# Patient Record
Sex: MALE | Race: WHITE | NOT HISPANIC OR LATINO | Employment: FULL TIME | URBAN - METROPOLITAN AREA
[De-identification: names, ages, dates, MRNs, and addresses within clinical notes are randomized per-mention and may not be internally consistent; named-entity substitution may affect disease eponyms.]

---

## 2023-03-07 ENCOUNTER — CLINICAL SUPPORT (OUTPATIENT)
Dept: PHARMACY | Facility: HOSPITAL | Age: 58
End: 2023-03-07
Payer: COMMERCIAL

## 2023-03-07 DIAGNOSIS — E11.69 DIABETES MELLITUS ASSOCIATED WITH HORMONAL ETIOLOGY (MULTI): Primary | ICD-10-CM

## 2023-03-07 RX ORDER — ATORVASTATIN CALCIUM 40 MG/1
40 TABLET, FILM COATED ORAL DAILY
COMMUNITY
End: 2023-05-30 | Stop reason: SDUPTHER

## 2023-03-07 RX ORDER — ASPIRIN 81 MG/1
81 TABLET ORAL DAILY
COMMUNITY
End: 2023-09-26 | Stop reason: ALTCHOICE

## 2023-03-07 RX ORDER — DAPAGLIFLOZIN 10 MG/1
TABLET, FILM COATED ORAL
COMMUNITY
End: 2023-05-30 | Stop reason: SDUPTHER

## 2023-03-07 RX ORDER — METFORMIN HYDROCHLORIDE 500 MG/1
1000 TABLET, FILM COATED, EXTENDED RELEASE ORAL EVERY MORNING
COMMUNITY
End: 2023-08-01 | Stop reason: SDUPTHER

## 2023-03-07 RX ORDER — SEMAGLUTIDE 1.34 MG/ML
1 INJECTION, SOLUTION SUBCUTANEOUS
COMMUNITY
End: 2024-02-07 | Stop reason: WASHOUT

## 2023-03-16 PROBLEM — M79.644 PAIN OF FINGER OF RIGHT HAND: Status: ACTIVE | Noted: 2023-03-16

## 2023-03-16 PROBLEM — M25.511 CHRONIC RIGHT SHOULDER PAIN: Status: ACTIVE | Noted: 2023-03-16

## 2023-03-16 PROBLEM — H52.4 MYOPIA WITH PRESBYOPIA OF BOTH EYES: Status: ACTIVE | Noted: 2023-03-16

## 2023-03-16 PROBLEM — I10 HYPERTENSION: Status: ACTIVE | Noted: 2023-03-16

## 2023-03-16 PROBLEM — H02.59: Status: ACTIVE | Noted: 2023-03-16

## 2023-03-16 PROBLEM — H52.03 HYPERMETROPIA OF BOTH EYES: Status: ACTIVE | Noted: 2023-03-16

## 2023-03-16 PROBLEM — G89.29 CHRONIC RIGHT SHOULDER PAIN: Status: ACTIVE | Noted: 2023-03-16

## 2023-03-16 PROBLEM — E78.5 TYPE 2 DIABETES MELLITUS WITH HYPERLIPIDEMIA (MULTI): Status: ACTIVE | Noted: 2023-03-16

## 2023-03-16 PROBLEM — H52.13 MYOPIA WITH PRESBYOPIA OF BOTH EYES: Status: ACTIVE | Noted: 2023-03-16

## 2023-03-16 PROBLEM — R93.89 ABNORMAL CHEST CT: Status: ACTIVE | Noted: 2023-03-16

## 2023-03-16 PROBLEM — E11.69 TYPE 2 DIABETES MELLITUS WITH HYPERLIPIDEMIA (MULTI): Status: ACTIVE | Noted: 2023-03-16

## 2023-03-16 PROBLEM — E55.9 VITAMIN D DEFICIENCY: Status: ACTIVE | Noted: 2023-03-16

## 2023-03-16 PROBLEM — M13.0 ARTHRITIS OF MULTIPLE SITES: Status: ACTIVE | Noted: 2023-03-16

## 2023-03-16 PROBLEM — D12.6 TUBULAR ADENOMA OF COLON: Status: ACTIVE | Noted: 2023-03-16

## 2023-03-16 PROBLEM — M25.512 LEFT SHOULDER PAIN: Status: ACTIVE | Noted: 2023-03-16

## 2023-03-16 PROBLEM — H02.203: Status: ACTIVE | Noted: 2023-03-16

## 2023-03-16 PROBLEM — D23.9 DERMATOFIBROMA: Status: ACTIVE | Noted: 2023-03-16

## 2023-03-16 PROBLEM — J30.9 ALLERGIC RHINITIS: Status: ACTIVE | Noted: 2023-03-16

## 2023-03-16 RX ORDER — LISINOPRIL 10 MG/1
1 TABLET ORAL DAILY
COMMUNITY
Start: 2013-07-18 | End: 2023-03-29 | Stop reason: SDUPTHER

## 2023-03-16 RX ORDER — MELOXICAM 15 MG/1
1 TABLET ORAL DAILY
COMMUNITY
Start: 2022-04-14 | End: 2023-05-30 | Stop reason: SDUPTHER

## 2023-03-16 RX ORDER — NAPROXEN 500 MG/1
500 TABLET ORAL EVERY 12 HOURS PRN
COMMUNITY
End: 2023-09-26 | Stop reason: ALTCHOICE

## 2023-03-16 RX ORDER — FENOFIBRATE 160 MG/1
1 TABLET ORAL DAILY
COMMUNITY
Start: 2013-09-18 | End: 2023-05-30 | Stop reason: SDUPTHER

## 2023-03-20 ENCOUNTER — TELEMEDICINE (OUTPATIENT)
Dept: PHARMACY | Facility: HOSPITAL | Age: 58
End: 2023-03-20
Payer: COMMERCIAL

## 2023-03-20 DIAGNOSIS — E11.69 DIABETES MELLITUS ASSOCIATED WITH HORMONAL ETIOLOGY (MULTI): ICD-10-CM

## 2023-03-20 ASSESSMENT — ENCOUNTER SYMPTOMS: DIABETIC ASSOCIATED SYMPTOMS: 0

## 2023-03-20 NOTE — PROGRESS NOTES
Jose E Zelaya is a 58 y.o. male was referred to Clinical Pharmacy Team to complete a comprehensive medication review (CMR) with a pharmacist as part of the [employer] diabetes program.      Referring Provider: Wilton Gibbons DO    Subjective   No Known Allergies    [facility] - [facility], [place] - [address]  [address]  [facility] [address]  Phone: [phone] Fax: [phone]      Diabetes  He presents for his follow-up diabetic visit. He has type 2 diabetes mellitus. His disease course has been improving. There are no hypoglycemic associated symptoms. There are no diabetic associated symptoms. Current diabetic treatment includes oral agent (dual therapy) (Farxiga 10mg, Metformin  mg 2 tabs, Ozempic 1 mg once weekly). He is compliant with treatment all of the time. An ACE inhibitor/angiotensin II receptor blocker is being taken.       Objective     There were no vitals taken for this visit.     LAB  Lab Results   Component Value Date    BILITOT 0.6 12/02/2022    CALCIUM 10.0 12/02/2022    CO2 28 12/02/2022    CL 99 12/02/2022    CREATININE 1.03 12/02/2022    GLUCOSE 200 (H) 12/02/2022    ALKPHOS 100 12/02/2022    K 4.5 12/02/2022    PROT 7.1 12/02/2022     12/02/2022    AST 15 12/02/2022    ALT 18 12/02/2022    BUN 15 12/02/2022    ANIONGAP 15 12/02/2022    ALBUMIN 4.3 12/02/2022    GFRMALE 84 12/02/2022     Lab Results   Component Value Date    TRIG 177 (H) 12/02/2022    CHOL 198 12/02/2022    HDL 46.5 12/02/2022     Lab Results   Component Value Date    HGBA1C 8.6 (A) 12/02/2022       Current Outpatient Medications on File Prior to Visit   Medication Sig Dispense Refill    aspirin 81 mg EC tablet Take 1 tablet (81 mg) by mouth once daily.      atorvastatin (Lipitor) 40 mg tablet Take 1 tablet (40 mg) by mouth once daily.      dapagliflozin (Farxiga) 10 mg Take by mouth.      fenofibrate (Triglide) 160 mg tablet Take 1 tablet  (160 mg) by mouth once daily.      lisinopril 10 mg tablet Take 1 tablet (10 mg) by mouth once daily.      meloxicam (Mobic) 15 mg tablet Take 1 tablet (15 mg) by mouth once daily. WITH FOOD.      metFORMIN, MOD, (Glumetza) 500 mg 24 hr tablet Take 2 tablets (1,000 mg) by mouth once daily in the morning. Do not crush, chew, or split.      naproxen (Naprosyn) 500 mg tablet Take 1 tablet (500 mg) by mouth every 12 hours if needed for mild pain (1 - 3).      semaglutide (Ozempic) 1 mg/dose (4 mg/3 mL) pen injector Inject 1 mg under the skin every 7 days.      white petrolatum-mineral oil 94-3 % ophthalmic ointment Apply to affected eye(s). APPLY 1/4 INCH RIBBON INTO AFFECTED EYE(S) AT BEDTIME.       No current facility-administered medications on file prior to visit.       SECONDARY PREVENTION  - Statin? yes  - ACE-I/ARB? yes    Assessment/Plan   Problem List Items Addressed This Visit    None  Visit Diagnoses       Diabetes mellitus associated with hormonal etiology (CMS/[facility])              Patient information recorded and transmitted to [facility] for VBID override for diabetes medication copay reduction.    Follow up: 10-11 months    Bernard Rubin, PharmD     Verbal consent to manage patient's drug therapy was obtained from [the patient and/or an individual authorized to act on behalf of a patient]. They were informed they may decline to participate or withdraw from participation in pharmacy services at any time.

## 2023-03-29 DIAGNOSIS — I10 PRIMARY HYPERTENSION: Primary | ICD-10-CM

## 2023-04-03 RX ORDER — LISINOPRIL 10 MG/1
10 TABLET ORAL DAILY
Qty: 90 TABLET | Refills: 3 | Status: SHIPPED | OUTPATIENT
Start: 2023-04-03 | End: 2023-08-01 | Stop reason: SDUPTHER

## 2023-04-11 ENCOUNTER — PATIENT OUTREACH (OUTPATIENT)
Dept: CARE COORDINATION | Facility: CLINIC | Age: 58
End: 2023-04-11
Payer: COMMERCIAL

## 2023-04-11 ASSESSMENT — ENCOUNTER SYMPTOMS
LOSS OF SENSATION IN FEET: 0
DEPRESSION: 0
OCCASIONAL FEELINGS OF UNSTEADINESS: 0

## 2023-04-11 NOTE — CARE PLAN
Spoke with pt today.  Continue to follow for CM while enrolled in [employer].      Problem: Access to Care Issue  Goal: Assess and Address Access Barriers  Outcome: Progressing  Intervention: Confirm adherence with follow up care (labs, tests, studies) and scheduled appointments per provider/specialist instruction  Note: Chart review completed and pt is appropriate for [employer] program.  Outreach call completed and pt is agreeable to participation in same.  [facility] team to follow up w scheduling NPV.  Pamphlet was emailed to him per his request.  Initial assessment and Falls risk assessment completed today.

## 2023-05-30 DIAGNOSIS — E11.8 CONTROLLED DIABETES MELLITUS TYPE 2 WITH COMPLICATIONS, UNSPECIFIED WHETHER LONG TERM INSULIN USE (MULTI): ICD-10-CM

## 2023-05-30 DIAGNOSIS — M13.0 ARTHRITIS OF MULTIPLE SITES: Primary | ICD-10-CM

## 2023-05-30 DIAGNOSIS — E78.5 HYPERLIPIDEMIA, UNSPECIFIED HYPERLIPIDEMIA TYPE: ICD-10-CM

## 2023-05-30 RX ORDER — ATORVASTATIN CALCIUM 40 MG/1
40 TABLET, FILM COATED ORAL DAILY
Qty: 90 TABLET | Refills: 3 | Status: SHIPPED | OUTPATIENT
Start: 2023-05-30 | End: 2023-05-30

## 2023-05-30 RX ORDER — FLUTICASONE PROPIONATE 50 MCG
SPRAY, SUSPENSION (ML) NASAL
COMMUNITY
End: 2023-09-26 | Stop reason: ALTCHOICE

## 2023-05-30 RX ORDER — MELOXICAM 15 MG/1
15 TABLET ORAL DAILY
Qty: 90 TABLET | Refills: 3 | Status: SHIPPED | OUTPATIENT
Start: 2023-05-30 | End: 2023-05-30

## 2023-05-30 RX ORDER — DAPAGLIFLOZIN 10 MG/1
10 TABLET, FILM COATED ORAL DAILY
Qty: 90 TABLET | Refills: 3 | Status: SHIPPED | OUTPATIENT
Start: 2023-05-30 | End: 2023-09-26 | Stop reason: ALTCHOICE

## 2023-05-30 RX ORDER — FENOFIBRATE 160 MG/1
160 TABLET ORAL DAILY
Qty: 90 TABLET | Refills: 3 | Status: SHIPPED | OUTPATIENT
Start: 2023-05-30 | End: 2023-05-30

## 2023-05-30 RX ORDER — ROSUVASTATIN CALCIUM 40 MG/1
TABLET, COATED ORAL
COMMUNITY
End: 2023-09-26 | Stop reason: ALTCHOICE

## 2023-05-30 RX ORDER — GLIPIZIDE 10 MG/1
TABLET ORAL
COMMUNITY
Start: 2022-07-01 | End: 2023-09-26 | Stop reason: ALTCHOICE

## 2023-05-30 RX ORDER — BLOOD-GLUCOSE CONTROL, NORMAL
EACH MISCELLANEOUS
COMMUNITY
End: 2024-02-20 | Stop reason: ALTCHOICE

## 2023-05-30 RX ORDER — POLYETHYLENE GLYCOL 3350, SODIUM CHLORIDE, SODIUM BICARBONATE, POTASSIUM CHLORIDE 420; 11.2; 5.72; 1.48 G/4L; G/4L; G/4L; G/4L
POWDER, FOR SOLUTION ORAL
COMMUNITY
End: 2023-09-26 | Stop reason: ALTCHOICE

## 2023-05-30 NOTE — TELEPHONE ENCOUNTER
Patient left a VM requesting a refill for Fenofibrate, Atorvastatin, Meloxicam, and  Farxiga to be sent to the pharmacy on file.

## 2023-06-07 ENCOUNTER — PATIENT OUTREACH (OUTPATIENT)
Dept: CARE COORDINATION | Facility: CLINIC | Age: 58
End: 2023-06-07
Payer: COMMERCIAL

## 2023-06-07 NOTE — CARE PLAN
Spoke with pt; see below.      Problem: Access to Care Issue  Goal: Assess and Address Access Barriers  Intervention: Confirm adherence with follow up care (labs, tests, studies) and scheduled appointments per provider/specialist instruction  Note: Chart reviewed prior to outreach call.  Spoke w pt, reports he has been doing well with no issues or concerns.  Reviewed that lab orders are entered for him and he will have drawn prior to his NPV w [facility].  Attempting to follow a diabetic diet and has met w RD in past, so is generally aware of restrictions.  Checking BG only sporadically; reviewed this with him.  Has all meds and no refills are needed at this time. Does note nausea one day after Ozempic administration.  Will follow up quarterly; to let me know if any needs arise prior to that time, agreeable.

## 2023-08-01 DIAGNOSIS — I10 PRIMARY HYPERTENSION: ICD-10-CM

## 2023-08-01 DIAGNOSIS — E78.5 TYPE 2 DIABETES MELLITUS WITH HYPERLIPIDEMIA (MULTI): Primary | ICD-10-CM

## 2023-08-01 DIAGNOSIS — E11.69 TYPE 2 DIABETES MELLITUS WITH HYPERLIPIDEMIA (MULTI): Primary | ICD-10-CM

## 2023-08-01 PROBLEM — H02.051: Status: ACTIVE | Noted: 2023-08-01

## 2023-08-01 RX ORDER — METFORMIN HYDROCHLORIDE 500 MG/1
1000 TABLET, FILM COATED, EXTENDED RELEASE ORAL EVERY MORNING
Qty: 180 TABLET | Refills: 1 | Status: SHIPPED | OUTPATIENT
Start: 2023-08-01 | End: 2023-09-26 | Stop reason: ALTCHOICE

## 2023-08-01 RX ORDER — LISINOPRIL 10 MG/1
10 TABLET ORAL DAILY
Qty: 90 TABLET | Refills: 3 | Status: SHIPPED | OUTPATIENT
Start: 2023-08-01 | End: 2023-09-26 | Stop reason: ALTCHOICE

## 2023-08-01 NOTE — TELEPHONE ENCOUNTER
Patient left a VM requesting a refill for Metformin and Lisinopril to be sent  [facility] on file.

## 2023-08-08 LAB
ALANINE AMINOTRANSFERASE (SGPT) (U/L) IN SER/PLAS: 20 U/L (ref 10–52)
ALBUMIN (G/DL) IN SER/PLAS: 4.4 G/DL (ref 3.4–5)
ALBUMIN (MG/L) IN URINE: <7 MG/L
ALBUMIN/CREATININE (UG/MG) IN URINE: NORMAL UG/MG CRT (ref 0–30)
ALKALINE PHOSPHATASE (U/L) IN SER/PLAS: 71 U/L (ref 33–120)
ANION GAP IN SER/PLAS: 12 MMOL/L (ref 10–20)
ASPARTATE AMINOTRANSFERASE (SGOT) (U/L) IN SER/PLAS: 20 U/L (ref 9–39)
BILIRUBIN TOTAL (MG/DL) IN SER/PLAS: 0.5 MG/DL (ref 0–1.2)
CALCIUM (MG/DL) IN SER/PLAS: 9.8 MG/DL (ref 8.6–10.6)
CARBON DIOXIDE, TOTAL (MMOL/L) IN SER/PLAS: 29 MMOL/L (ref 21–32)
CHLORIDE (MMOL/L) IN SER/PLAS: 103 MMOL/L (ref 98–107)
CHOLESTEROL (MG/DL) IN SER/PLAS: 180 MG/DL (ref 0–199)
CHOLESTEROL IN HDL (MG/DL) IN SER/PLAS: 53.5 MG/DL
CHOLESTEROL/HDL RATIO: 3.4
CREATININE (MG/DL) IN SER/PLAS: 0.98 MG/DL (ref 0.5–1.3)
CREATININE (MG/DL) IN URINE: 55.6 MG/DL (ref 20–370)
ESTIMATED AVERAGE GLUCOSE FOR HBA1C: 177 MG/DL
GFR MALE: 89 ML/MIN/1.73M2
GLUCOSE (MG/DL) IN SER/PLAS: 164 MG/DL (ref 74–99)
HEMOGLOBIN A1C/HEMOGLOBIN TOTAL IN BLOOD: 7.8 %
LDL: 93 MG/DL (ref 0–99)
POTASSIUM (MMOL/L) IN SER/PLAS: 4.7 MMOL/L (ref 3.5–5.3)
PROTEIN TOTAL: 6.9 G/DL (ref 6.4–8.2)
SODIUM (MMOL/L) IN SER/PLAS: 139 MMOL/L (ref 136–145)
TRIGLYCERIDE (MG/DL) IN SER/PLAS: 169 MG/DL (ref 0–149)
UREA NITROGEN (MG/DL) IN SER/PLAS: 11 MG/DL (ref 6–23)
VLDL: 34 MG/DL (ref 0–40)

## 2023-09-07 ENCOUNTER — PATIENT OUTREACH (OUTPATIENT)
Dept: CARE COORDINATION | Facility: CLINIC | Age: 58
End: 2023-09-07
Payer: COMMERCIAL

## 2023-09-07 NOTE — CARE PLAN
Spoke w pt; see below:      Problem: Access to Care Issue  Goal: Assess and Address Access Barriers  Intervention: Confirm adherence with follow up care (labs, tests, studies) and scheduled appointments per provider/specialist instruction  Note: Brief chart review conducted prior to outreach call; no new hospitalizations or ED visits noted.   Had new pt visit [facility] chaged to 8.23, notes reviewed.  Meds adjusted.  Spoke w Jose E today.  Reports he has been doing well but will not be using the CGM due to copay cost.  Encouraged to monitor BG w fingersticks and to ensure 30 min of exercise 5 days/week.  (Does have an active dog and encouraged daily walks.)  Has been working w RD in [facility] for DM/weight mgt.  Had 24 hr monitor for BP surveillance.  To [facility] in Nov for next [facility] appt (not scheduled yet--email to [facility] team to follow up) and was scheduled today for PCP 6 month follow up visit on 9.19 at 1030 @ [place] location.  Up to date w colonoscopy and has Ophthalmology appt pending.  Does not see Podiatry but states he checks his feet daily.  Med list reviewed and confirms adherence.  Continue to follow.  Encouraged to call for issues/problems/concerns that may arise prior to next planned outreach; agreeable.

## 2023-09-19 ENCOUNTER — APPOINTMENT (OUTPATIENT)
Dept: PRIMARY CARE | Facility: CLINIC | Age: 58
End: 2023-09-19
Payer: COMMERCIAL

## 2023-09-26 ENCOUNTER — OFFICE VISIT (OUTPATIENT)
Dept: PRIMARY CARE | Facility: CLINIC | Age: 58
End: 2023-09-26
Payer: COMMERCIAL

## 2023-09-26 VITALS
DIASTOLIC BLOOD PRESSURE: 62 MMHG | OXYGEN SATURATION: 99 % | BODY MASS INDEX: 25.99 KG/M2 | WEIGHT: 191.6 LBS | SYSTOLIC BLOOD PRESSURE: 112 MMHG | HEART RATE: 65 BPM

## 2023-09-26 DIAGNOSIS — E78.5 TYPE 2 DIABETES MELLITUS WITH HYPERLIPIDEMIA (MULTI): Primary | ICD-10-CM

## 2023-09-26 DIAGNOSIS — E11.69 TYPE 2 DIABETES MELLITUS WITH HYPERLIPIDEMIA (MULTI): Primary | ICD-10-CM

## 2023-09-26 PROCEDURE — 4010F ACE/ARB THERAPY RXD/TAKEN: CPT | Performed by: STUDENT IN AN ORGANIZED HEALTH CARE EDUCATION/TRAINING PROGRAM

## 2023-09-26 PROCEDURE — 3078F DIAST BP <80 MM HG: CPT | Performed by: STUDENT IN AN ORGANIZED HEALTH CARE EDUCATION/TRAINING PROGRAM

## 2023-09-26 PROCEDURE — 3074F SYST BP LT 130 MM HG: CPT | Performed by: STUDENT IN AN ORGANIZED HEALTH CARE EDUCATION/TRAINING PROGRAM

## 2023-09-26 PROCEDURE — 3051F HG A1C>EQUAL 7.0%<8.0%: CPT | Performed by: STUDENT IN AN ORGANIZED HEALTH CARE EDUCATION/TRAINING PROGRAM

## 2023-09-26 PROCEDURE — 99213 OFFICE O/P EST LOW 20 MIN: CPT | Performed by: STUDENT IN AN ORGANIZED HEALTH CARE EDUCATION/TRAINING PROGRAM

## 2023-09-26 RX ORDER — DAPAGLIFLOZIN AND METFORMIN HYDROCHLORIDE 5; 1000 MG/1; MG/1
2 TABLET, FILM COATED, EXTENDED RELEASE ORAL
COMMUNITY
Start: 2023-08-23 | End: 2024-02-07 | Stop reason: WASHOUT

## 2023-09-26 RX ORDER — LISINOPRIL 20 MG/1
20 TABLET ORAL DAILY
COMMUNITY
Start: 2023-08-25 | End: 2024-02-07 | Stop reason: WASHOUT

## 2023-09-26 ASSESSMENT — PAIN SCALES - GENERAL: PAINLEVEL: 0-NO PAIN

## 2023-09-26 ASSESSMENT — ENCOUNTER SYMPTOMS: DEPRESSION: 0

## 2023-09-26 NOTE — PATIENT INSTRUCTIONS
1.  Comes in for diabetes follow-up.  He has had some medication adjustments.  Most recent A1c was below 8 last month.  We discussed having repeat labs in mid November, to see how the new medication regime is helping.  Diabetes can be progressive (requiring more medications with time). If we can't regulate your Hemoglobin A1C with current meds we will add more in the future and consider Insulin therapy. Persistently elevated blood sugars can damage small blood vessels and nerves in the brain, eyes, heart, kidneys and feet. You should be getting yearly dilated eye exams. You should be inspecting your feet daily for any injuries. Also do not walk bare foot indoors or outside. Weight loss (to a BMI 30) and regular exercise (30 minutes per day) can greatly improve your need for medications for your diabetes. A hemoglobin A1c (3 month blood sugar average) will be checked 1-4 times per year to help monitor you blood sugar control.We are aggressive at treating diabetes because it is a significant risk factor for heart disease and stroke. Please reduce the amount of carbohydrates you are eating, and instead try eating more lean protein such as fish, turkey, and chicken. Please try to follow an ADA (American Diabetes Association) diet.    Call for any refills needed

## 2023-09-26 NOTE — PROGRESS NOTES
Subjective   Patient ID: Jose E Zelaya is a 58 y.o. male who presents for Diabetes (6 month follow up.).    HPI comes in for 6-month diabetes follow-up he had some medication adjustments 3 to 4 weeks ago with diabetes nutrition specialist team    Review of Systems  Constitutional: NO F, chills, or sweats  Eyes: no blurred vision or visual disturbance  ENT: no hearing loss, no congestion, no nasal discharge, no hoarseness and no sore throat.   Cardiovascular: no chest pain, no edema, no palps and no syncope.   Respiratory: no cough,no s.o.b. and no wheezing  Gastrointestinal: no abdominal pain, No C/D no N/V, no blood in stools  Genitourinary: no dysuria, no change in urinary frequency, no urinary hesitancy and no feelings of urinary urgency.   Musculoskeletal: no arthralgias,  no back pain and no myalgias.   Integumentary: no new skin lesions and no rashes.   Neurological: no difficulty walking, no headache, no limb weakness, no numbness and no tingling.   Psychiatric: no anxiety, no depression, no anhedonia and no substance use disorders.   Endocrine: no recent weight gain and no recent weight loss.   Hematologic/Lymphatic: no tendency for easy bruising and no swollen glands.  Objective   /62 (BP Location: Left arm, Patient Position: Sitting, BP Cuff Size: Adult)   Pulse 65   Wt 86.9 kg (191 lb 9.6 oz)   SpO2 99%   BMI 25.99 kg/m²     Physical Exam  gen- a & o x 3, nad, pleasant  heent- eomi, perrla, ear canals patent, TM's non-erythematous, no fluid, frontal and maxillary sinus's nontender  neck- supple, nontender, no palpable or enlarged nodes, no thyromegaly  heart- rrr, no murmurs  lungs- cta b/l , no w/r/r    Assessment/Plan     1.  Comes in for diabetes follow-up.  He has had some medication adjustments.  Most recent A1c was below 8 last month.  We discussed having repeat labs in mid November, to see how the new medication regime is helping.  Diabetes can be progressive (requiring more medications  with time). If we can't regulate your Hemoglobin A1C with current meds we will add more in the future and consider Insulin therapy. Persistently elevated blood sugars can damage small blood vessels and nerves in the brain, eyes, heart, kidneys and feet. You should be getting yearly dilated eye exams. You should be inspecting your feet daily for any injuries. Also do not walk bare foot indoors or outside. Weight loss (to a BMI 30) and regular exercise (30 minutes per day) can greatly improve your need for medications for your diabetes. A hemoglobin A1c (3 month blood sugar average) will be checked 1-4 times per year to help monitor you blood sugar control.We are aggressive at treating diabetes because it is a significant risk factor for heart disease and stroke. Please reduce the amount of carbohydrates you are eating, and instead try eating more lean protein such as fish, turkey, and chicken. Please try to follow an ADA (American Diabetes Association) diet.    Call for any refills needed

## 2023-11-16 ENCOUNTER — PHARMACY VISIT (OUTPATIENT)
Dept: PHARMACY | Facility: CLINIC | Age: 58
End: 2023-11-16
Payer: COMMERCIAL

## 2023-11-16 PROCEDURE — RXMED WILLOW AMBULATORY MEDICATION CHARGE

## 2023-11-17 ENCOUNTER — PHARMACY VISIT (OUTPATIENT)
Dept: PHARMACY | Facility: CLINIC | Age: 58
End: 2023-11-17
Payer: COMMERCIAL

## 2023-11-17 PROCEDURE — RXMED WILLOW AMBULATORY MEDICATION CHARGE

## 2023-11-22 ENCOUNTER — PHARMACY VISIT (OUTPATIENT)
Dept: PHARMACY | Facility: CLINIC | Age: 58
End: 2023-11-22
Payer: COMMERCIAL

## 2023-11-22 PROCEDURE — RXMED WILLOW AMBULATORY MEDICATION CHARGE

## 2023-12-07 ENCOUNTER — PATIENT OUTREACH (OUTPATIENT)
Dept: CARE COORDINATION | Facility: CLINIC | Age: 58
End: 2023-12-07
Payer: COMMERCIAL

## 2023-12-07 NOTE — CARE PLAN
Spoke w pt; see below.  Continue to follow.      Problem: Access to Care Issue  Goal: Assess and Address Access Barriers  Intervention: Confirm adherence with follow up care (labs, tests, studies) and scheduled appointments per provider/specialist instruction  Note: Chart reviewed prior to outreach call.  No new hospitalizations or ED visits since our last conversation.  Pt reports he has been doing well.  Was seen by PCP in Sept and has [facility] Pharmacy appts scheduled for Feb 2024.  Checking BG periodically; reports no hypo or hyperglycemic episodes.  Meds reviewed.  Notes periodic nausea following Ozempic administration and occasional loose stools w metformin.  No refills needed.  Continue to follow.

## 2023-12-18 ENCOUNTER — TELEPHONE (OUTPATIENT)
Dept: PRIMARY CARE | Facility: CLINIC | Age: 58
End: 2023-12-18
Payer: COMMERCIAL

## 2023-12-18 DIAGNOSIS — U07.1 COVID-19: Primary | ICD-10-CM

## 2023-12-18 RX ORDER — NIRMATRELVIR AND RITONAVIR 300-100 MG
3 KIT ORAL 2 TIMES DAILY
Qty: 30 TABLET | Refills: 0 | Status: SHIPPED | OUTPATIENT
Start: 2023-12-18 | End: 2023-12-23

## 2023-12-18 NOTE — TELEPHONE ENCOUNTER
Patient called stating he tested positive for Covid and if you can [facility]

## 2024-01-29 DIAGNOSIS — E11.69 TYPE 2 DIABETES MELLITUS WITH HYPERLIPIDEMIA (MULTI): Primary | ICD-10-CM

## 2024-01-29 DIAGNOSIS — E78.5 TYPE 2 DIABETES MELLITUS WITH HYPERLIPIDEMIA (MULTI): Primary | ICD-10-CM

## 2024-02-02 ENCOUNTER — LAB (OUTPATIENT)
Dept: LAB | Facility: LAB | Age: 59
End: 2024-02-02
Payer: COMMERCIAL

## 2024-02-02 DIAGNOSIS — E78.5 TYPE 2 DIABETES MELLITUS WITH HYPERLIPIDEMIA (MULTI): ICD-10-CM

## 2024-02-02 DIAGNOSIS — E11.69 TYPE 2 DIABETES MELLITUS WITH HYPERLIPIDEMIA (MULTI): ICD-10-CM

## 2024-02-02 LAB
ALBUMIN SERPL BCP-MCNC: 4.6 G/DL (ref 3.4–5)
ALP SERPL-CCNC: 67 U/L (ref 33–120)
ALT SERPL W P-5'-P-CCNC: 16 U/L (ref 10–52)
ANION GAP SERPL CALC-SCNC: 15 MMOL/L (ref 10–20)
APPEARANCE UR: CLEAR
AST SERPL W P-5'-P-CCNC: 15 U/L (ref 9–39)
BILIRUB SERPL-MCNC: 0.7 MG/DL (ref 0–1.2)
BILIRUB UR STRIP.AUTO-MCNC: NEGATIVE MG/DL
BUN SERPL-MCNC: 18 MG/DL (ref 6–23)
CALCIUM SERPL-MCNC: 10.2 MG/DL (ref 8.6–10.6)
CHLORIDE SERPL-SCNC: 101 MMOL/L (ref 98–107)
CHOLEST SERPL-MCNC: 143 MG/DL (ref 0–199)
CHOLESTEROL/HDL RATIO: 3.2
CO2 SERPL-SCNC: 28 MMOL/L (ref 21–32)
COLOR UR: YELLOW
CREAT SERPL-MCNC: 1.11 MG/DL (ref 0.5–1.3)
CREAT UR-MCNC: 141.1 MG/DL (ref 20–370)
EGFRCR SERPLBLD CKD-EPI 2021: 77 ML/MIN/1.73M*2
EST. AVERAGE GLUCOSE BLD GHB EST-MCNC: 160 MG/DL
GLUCOSE SERPL-MCNC: 129 MG/DL (ref 74–99)
GLUCOSE UR STRIP.AUTO-MCNC: ABNORMAL MG/DL
HBA1C MFR BLD: 7.2 %
HDLC SERPL-MCNC: 45.2 MG/DL
KETONES UR STRIP.AUTO-MCNC: ABNORMAL MG/DL
LDLC SERPL CALC-MCNC: 74 MG/DL
LEUKOCYTE ESTERASE UR QL STRIP.AUTO: NEGATIVE
MICROALBUMIN UR-MCNC: 7.1 MG/L
MICROALBUMIN/CREAT UR: 5 UG/MG CREAT
NITRITE UR QL STRIP.AUTO: NEGATIVE
NON HDL CHOLESTEROL: 98 MG/DL (ref 0–149)
PH UR STRIP.AUTO: 5 [PH]
POTASSIUM SERPL-SCNC: 4.7 MMOL/L (ref 3.5–5.3)
PROT SERPL-MCNC: 7 G/DL (ref 6.4–8.2)
PROT UR STRIP.AUTO-MCNC: NEGATIVE MG/DL
RBC # UR STRIP.AUTO: NEGATIVE /UL
SODIUM SERPL-SCNC: 139 MMOL/L (ref 136–145)
SP GR UR STRIP.AUTO: 1.04
TRIGL SERPL-MCNC: 119 MG/DL (ref 0–149)
UROBILINOGEN UR STRIP.AUTO-MCNC: <2 MG/DL
VLDL: 24 MG/DL (ref 0–40)

## 2024-02-02 PROCEDURE — 81003 URINALYSIS AUTO W/O SCOPE: CPT

## 2024-02-02 PROCEDURE — 80061 LIPID PANEL: CPT

## 2024-02-02 PROCEDURE — 83036 HEMOGLOBIN GLYCOSYLATED A1C: CPT

## 2024-02-02 PROCEDURE — 82043 UR ALBUMIN QUANTITATIVE: CPT

## 2024-02-02 PROCEDURE — 80053 COMPREHEN METABOLIC PANEL: CPT

## 2024-02-02 PROCEDURE — 36415 COLL VENOUS BLD VENIPUNCTURE: CPT

## 2024-02-02 PROCEDURE — 82570 ASSAY OF URINE CREATININE: CPT

## 2024-02-05 ENCOUNTER — PHARMACY VISIT (OUTPATIENT)
Dept: PHARMACY | Facility: CLINIC | Age: 59
End: 2024-02-05
Payer: COMMERCIAL

## 2024-02-05 DIAGNOSIS — E78.5 TYPE 2 DIABETES MELLITUS WITH HYPERLIPIDEMIA (MULTI): Primary | ICD-10-CM

## 2024-02-05 DIAGNOSIS — E11.69 TYPE 2 DIABETES MELLITUS WITH HYPERLIPIDEMIA (MULTI): Primary | ICD-10-CM

## 2024-02-05 PROBLEM — R93.89 ABNORMAL COMPUTERIZED AXIAL TOMOGRAPHY OF CHEST: Status: ACTIVE | Noted: 2023-03-16

## 2024-02-05 PROBLEM — E11.9 TYPE 2 DIABETES MELLITUS (MULTI): Status: ACTIVE | Noted: 2023-03-16

## 2024-02-05 PROBLEM — E66.3 OVERWEIGHT WITH BODY MASS INDEX (BMI) 25.0-29.9: Status: ACTIVE | Noted: 2024-02-05

## 2024-02-05 PROBLEM — M79.646 PAIN OF FINGER: Status: ACTIVE | Noted: 2024-02-05

## 2024-02-05 PROBLEM — H52.03 HYPEROPIA OF BOTH EYES: Status: ACTIVE | Noted: 2020-07-09

## 2024-02-05 PROBLEM — H02.203 LAGOPHTHALMOS OF RIGHT EYE: Status: ACTIVE | Noted: 2023-03-16

## 2024-02-05 PROBLEM — M72.2 PLANTAR FASCIAL FIBROMATOSIS OF LEFT FOOT: Status: ACTIVE | Noted: 2024-02-05

## 2024-02-05 PROBLEM — G89.29 CHRONIC PAIN OF RIGHT UPPER EXTREMITY: Status: ACTIVE | Noted: 2023-03-16

## 2024-02-05 PROBLEM — M25.512 PAIN OF LEFT SHOULDER REGION: Status: ACTIVE | Noted: 2022-11-15

## 2024-02-05 PROBLEM — M19.90 ARTHRITIS: Status: ACTIVE | Noted: 2023-03-16

## 2024-02-05 PROBLEM — H02.059 TRICHIASIS: Status: ACTIVE | Noted: 2023-08-01

## 2024-02-05 PROBLEM — D38.1 NEOPLASM OF UNCERTAIN BEHAVIOR OF LUNG: Status: ACTIVE | Noted: 2024-02-05

## 2024-02-05 PROBLEM — M79.601 CHRONIC PAIN OF RIGHT UPPER EXTREMITY: Status: ACTIVE | Noted: 2023-03-16

## 2024-02-05 PROCEDURE — RXMED WILLOW AMBULATORY MEDICATION CHARGE

## 2024-02-05 RX ORDER — SEMAGLUTIDE 1.34 MG/ML
1 INJECTION, SOLUTION SUBCUTANEOUS
Qty: 9 ML | Refills: 3 | Status: SHIPPED | OUTPATIENT
Start: 2024-02-05 | End: 2025-02-03

## 2024-02-07 ENCOUNTER — OFFICE VISIT (OUTPATIENT)
Dept: CARDIOLOGY | Facility: CLINIC | Age: 59
End: 2024-02-07
Payer: COMMERCIAL

## 2024-02-07 VITALS
WEIGHT: 189 LBS | SYSTOLIC BLOOD PRESSURE: 152 MMHG | HEART RATE: 78 BPM | BODY MASS INDEX: 25.6 KG/M2 | OXYGEN SATURATION: 97 % | HEIGHT: 72 IN | DIASTOLIC BLOOD PRESSURE: 89 MMHG

## 2024-02-07 DIAGNOSIS — I10 PRIMARY HYPERTENSION: ICD-10-CM

## 2024-02-07 DIAGNOSIS — E11.9 TYPE 2 DIABETES MELLITUS WITHOUT COMPLICATION, WITHOUT LONG-TERM CURRENT USE OF INSULIN (MULTI): Primary | ICD-10-CM

## 2024-02-07 PROCEDURE — 3061F NEG MICROALBUMINURIA REV: CPT | Performed by: INTERNAL MEDICINE

## 2024-02-07 PROCEDURE — 3048F LDL-C <100 MG/DL: CPT | Performed by: INTERNAL MEDICINE

## 2024-02-07 PROCEDURE — 3079F DIAST BP 80-89 MM HG: CPT | Performed by: INTERNAL MEDICINE

## 2024-02-07 PROCEDURE — 3051F HG A1C>EQUAL 7.0%<8.0%: CPT | Performed by: INTERNAL MEDICINE

## 2024-02-07 PROCEDURE — 99214 OFFICE O/P EST MOD 30 MIN: CPT | Performed by: INTERNAL MEDICINE

## 2024-02-07 PROCEDURE — 4010F ACE/ARB THERAPY RXD/TAKEN: CPT | Performed by: INTERNAL MEDICINE

## 2024-02-07 PROCEDURE — 3077F SYST BP >= 140 MM HG: CPT | Performed by: INTERNAL MEDICINE

## 2024-02-07 PROCEDURE — RXMED WILLOW AMBULATORY MEDICATION CHARGE

## 2024-02-07 RX ORDER — LISINOPRIL 40 MG/1
40 TABLET ORAL DAILY
Qty: 90 TABLET | Refills: 3 | Status: SHIPPED | OUTPATIENT
Start: 2024-02-07 | End: 2025-02-06

## 2024-02-07 ASSESSMENT — ENCOUNTER SYMPTOMS
EYES NEGATIVE: 1
ENDOCRINE NEGATIVE: 1
LOSS OF SENSATION IN FEET: 0
RESPIRATORY NEGATIVE: 1
HEMATOLOGIC/LYMPHATIC NEGATIVE: 1
OCCASIONAL FEELINGS OF UNSTEADINESS: 0
NEUROLOGICAL NEGATIVE: 1
ALLERGIC/IMMUNOLOGIC NEGATIVE: 1
GASTROINTESTINAL NEGATIVE: 1
PSYCHIATRIC NEGATIVE: 1
CONSTITUTIONAL NEGATIVE: 1
DEPRESSION: 0
CARDIOVASCULAR NEGATIVE: 1
MUSCULOSKELETAL NEGATIVE: 1

## 2024-02-07 ASSESSMENT — PAIN SCALES - GENERAL: PAINLEVEL: 0-NO PAIN

## 2024-02-07 NOTE — PROGRESS NOTES
[facility]-Metabolic Disease (Former[facility]) Note    Reason for Visit: Follow-up  Referring Clinician: No ref. provider found     History of Present Illness: Mr. Zelaya is a 58-year-old gentleman with type 2 diabetes mellitus, hypertension, overweight, former smoker who was referred by the [facility] care organization to the [facility] for cardiometabolic risk factor optimization.  Since his last visit he has been doing well without any intercurrent health events or hospitalizations.  His glycemic control has been improved and his most recent A1c is 7.2%.  He is tolerating his medications well without any adverse effects.  He prefers to use a glucometer for finger checks versus a CGM device.  Since his last visit we did perform a 24-hour ambulatory blood pressure test which showed elevated blood pressure so increase the dose of his lisinopril from 10 mg to 20 mg.  His blood pressure continues to be elevated and is high in the office today at 152/89 mmHg with repeat also being high.  He does not have any exertional chest pain, shortness of breath, palpitations, or syncope.    Past Medical History:  He has a past medical history of Acute upper respiratory infection, unspecified (04/02/2018), Acute upper respiratory infection, unspecified (03/25/2016), Disorder of the skin and subcutaneous tissue, unspecified (08/10/2019), Encounter for screening for malignant neoplasm of colon (12/17/2018), Personal history of neoplasm of uncertain behavior (02/26/2021), Personal history of other diseases of the musculoskeletal system and connective tissue (03/03/2015), Personal history of other drug therapy, Plantar fascial fibromatosis (02/19/2019), Type 2 diabetes mellitus without complications (CMS/HCC) (12/10/2021), and Unspecified adverse effect of drug or medicament, initial encounter (CODE) (04/03/2015).    Past Surgical History:  He has a past surgical history that includes Appendectomy  (07/08/2018).    Social History:  He reports that he has been smoking cigars. He has been exposed to tobacco smoke. He has never used smokeless tobacco. He reports current alcohol use of about 4.0 standard drinks of alcohol per week. He reports that he does not use drugs.    Family History:  Family History   Problem Relation Name Age of Onset    Lung disease Mother      Other (cardiac disorder) Father         Allergies:  Patient has no known allergies.    Outpatient Medications:  Current Outpatient Medications   Medication Instructions    atorvastatin (Lipitor) 40 mg tablet TAKE 1 TABLET BY MOUTH ONCE DAILY    blood sugar diagnostic (ONETOUCH ULTRA BLUE TEST STRIP MISC)     cholecalciferol, vitamin D3, (VITAMIN D3 ORAL) 1 tablet, oral, Daily    dapaglifloz propaned-metformin (Xigduo XR) 5-1,000 mg TAKE 2 TABLETS BY MOUTH ONCE DAILY    fenofibrate (Triglide) 160 mg tablet TAKE 1 TABLET BY MOUTH ONCE DAILY    lancets 30 gauge misc     lisinopril 20 mg tablet TAKE 1 TABLET BY MOUTH ONCE DAILY    meloxicam (Mobic) 15 mg tablet TAKE 1 TABLET BY MOUTH ONCE DAILY WITH FOOD    omega-3/dha/epa/fish oil (OMEGA-3 FISH OIL ORAL) 2 tablets, oral, Daily    semaglutide (Ozempic) 1 mg/dose (4 mg/3 mL) pen injector INJECT 1 MG UNDER THE SKIN ONCE WEEKLY       Review of Systems:  Review of Systems   Constitutional: Negative.   HENT: Negative.     Eyes: Negative.    Cardiovascular: Negative.    Respiratory: Negative.     Endocrine: Negative.    Hematologic/Lymphatic: Negative.    Skin: Negative.    Musculoskeletal: Negative.    Gastrointestinal: Negative.    Genitourinary: Negative.    Neurological: Negative.    Psychiatric/Behavioral: Negative.     Allergic/Immunologic: Negative.        Last Recorded Vitals:  Vitals:    02/07/24 0846   BP: 152/89   BP Location: Right arm   Patient Position: Sitting   BP Cuff Size: Adult   Pulse: 78   SpO2: 97%   Weight: 85.7 kg (189 lb)   Height: 1.829 m (6')       Physical Examination:  General:  "Well appearing, well-nourished, in no acute distress.  HEENT: Normocephalic atraumatic, pupils equal and reactive to light, extraocular muscles intact, no conjunctival injection, oropharynx clear without exudates.  Neck: Normal carotid arterial pulses, no arterial bruits, no thyromegaly.  Cardiac: Regular rhythm and normal heart rate.  S1, S2 present and normal.  No murmurs, rubs, or gallops.  PMI is nondisplaced. Jugular venous pulsations are normal.  Pulmonary: Normal breath sounds, no increased work of breathing, no wheezes or crackles.  GI: Normal bowel sounds, abdominal aorta not enlarged, no hepatosplenomegaly, no abdominal bruits.  Lower extremities: No cyanosis, clubbing, or edema.  No xanthelasma present. Normal distal pulses.  Skin: Skin intact. No significant rashes or lesions present.  Neuro: Alert and oriented x 3, normal attention and cognition, no focal motor or sensory neurologic deficits.  Psych: Normal affect and mood.  Musculoskeletal: Normal gait normal muscle tone.    Laboratory Studies:  Lab Results   Component Value Date    GLUCOSE 129 (H) 02/02/2024    CALCIUM 10.2 02/02/2024     02/02/2024    K 4.7 02/02/2024    CO2 28 02/02/2024     02/02/2024    BUN 18 02/02/2024    CREATININE 1.11 02/02/2024     Lab Results   Component Value Date    ALT 16 02/02/2024    AST 15 02/02/2024    ALKPHOS 67 02/02/2024    BILITOT 0.7 02/02/2024     Results from last 7 days   Lab Units 02/02/24  0809   CHOLESTEROL mg/dL 143   TRIGLYCERIDES mg/dL 119   HDL mg/dL 45.2     Lab Results   Component Value Date    CHOL 143 02/02/2024    CHOL 180 08/08/2023    CHOL 198 12/02/2022     Lab Results   Component Value Date    HDL 45.2 02/02/2024    HDL 53.5 08/08/2023    HDL 46.5 12/02/2022     Lab Results   Component Value Date    LDLCALC 74 02/02/2024     Lab Results   Component Value Date    TRIG 119 02/02/2024    TRIG 169 (H) 08/08/2023    TRIG 177 (H) 12/02/2022     No components found for: \"CHOLHDL\"  Lab " Results   Component Value Date    HGBA1C 7.2 (H) 02/02/2024     UACR <30 mg/g      Assessment and Plan:  Problem List Items Addressed This Visit          Cardiac and Vasculature    Hypertension       Endocrine/Metabolic    Type 2 diabetes mellitus (CMS/HCC) - Primary     Mr. Zelaya is a 58-year-old gentleman with type 2 diabetes mellitus, hypertension, overweight, former smoker who was referred by the accountable care organization to the [facility] for cardiometabolic risk factor optimization.  Since his last visit he has done well with improved risk factor levels including glycemia, lipids, and UACR.  He continues to have elevated blood pressure so I recommend increasing the dose of lisinopril to 40 mg daily and continue to home ambulatory blood pressure checks using his home cuff.  We also discussed considering increasing the dose of his GLP 1 receptor agonist for better glycemic control but he would like to stay on the current dose at this time.  I did not make any other changes to his medical regimen today.  We will see him again in 6 months here in the office for routine follow-up with repeat laboratory studies at that time.  Please do not hesitate to call or contact me with questions or concerns.    I spent 35 minutes of face-to-face time with the patient, with more than 50% of that time spent in counseling and/or coordination of care.    Davidson Oconnor MD, ABUNDIO, FACC  Director,  Center for Cardiovascular Prevention  Director, [facility] Program  Associate Professor of Medicine  [employer]

## 2024-02-12 PROCEDURE — RXMED WILLOW AMBULATORY MEDICATION CHARGE

## 2024-02-13 ENCOUNTER — PHARMACY VISIT (OUTPATIENT)
Dept: PHARMACY | Facility: CLINIC | Age: 59
End: 2024-02-13
Payer: COMMERCIAL

## 2024-02-15 ENCOUNTER — PHARMACY VISIT (OUTPATIENT)
Dept: PHARMACY | Facility: CLINIC | Age: 59
End: 2024-02-15
Payer: COMMERCIAL

## 2024-02-15 PROCEDURE — RXMED WILLOW AMBULATORY MEDICATION CHARGE

## 2024-02-20 ENCOUNTER — TELEMEDICINE (OUTPATIENT)
Dept: PHARMACY | Facility: HOSPITAL | Age: 59
End: 2024-02-20
Payer: COMMERCIAL

## 2024-02-20 DIAGNOSIS — E11.9 TYPE 2 DIABETES MELLITUS WITHOUT COMPLICATION, WITHOUT LONG-TERM CURRENT USE OF INSULIN (MULTI): Primary | ICD-10-CM

## 2024-02-20 PROCEDURE — RXMED WILLOW AMBULATORY MEDICATION CHARGE

## 2024-02-20 RX ORDER — LANCETS 33 GAUGE
1 EACH MISCELLANEOUS 2 TIMES DAILY
Qty: 100 EACH | Refills: 1 | Status: SHIPPED | OUTPATIENT
Start: 2024-02-20

## 2024-02-20 RX ORDER — BLOOD-GLUCOSE METER
1 EACH MISCELLANEOUS 2 TIMES DAILY
Qty: 100 STRIP | Refills: 1 | Status: SHIPPED | OUTPATIENT
Start: 2024-02-20

## 2024-02-20 ASSESSMENT — ENCOUNTER SYMPTOMS: DIABETIC ASSOCIATED SYMPTOMS: 0

## 2024-02-20 NOTE — ASSESSMENT & PLAN NOTE
Last A1c 7.2% 2/2/24. Above goal <7%.  Pharmacotherapy well optimized. Follows with [facility] clinic.  On Ozempic 1 mg weekly, Xigduo XR 5-1000 mg BID.  Does have occasional nausea and GI discomfort day following Ozempic injection. Discussed strategies to mitigate.  Lipids at or near goal 2/2/24 on Atorvastatin 40 mg daily.  HTN not assessed at telehealth visit    Plan:  Continue all current pharmacotherapy per [facility] clinic plan. Work on reducing pasta and other carbohydrate intake  Sending refills for testing supplies     Employee Health Diabetes Program (VBID)  - Patient enrolled in  Employee diabetes program for $0 co-pays on diabetes medications/supplies. Enrollment should be active in 2-4 weeks  - Requested VBID enrollment date: 2/20/24   - PharmD Management Level: 2

## 2024-02-20 NOTE — PROGRESS NOTES
[facility] (VB[facility])    Jose E Zelaya is a 58 y.o. male was referred to Clinical Pharmacy Team to complete a comprehensive medication review (CMR) with a pharmacist as part of the [employer] diabetes program.      Referring Provider: Wilton Gibbons DO    Subjective   No Known Allergies    [facility]  [address]  [place] [address]  Phone: [phone] Fax: [phone]      Diabetes  He presents for his follow-up diabetic visit. He has type 2 diabetes mellitus. His disease course has been stable. There are no hypoglycemic associated symptoms. There are no diabetic associated symptoms. There are no hypoglycemic complications. There are no diabetic complications. Risk factors for coronary artery disease include dyslipidemia, diabetes mellitus, hypertension, male sex, tobacco exposure and sedentary lifestyle. Current diabetic treatment includes oral agent (triple therapy). He is compliant with treatment all of the time. He is following a generally unhealthy diet. Meal planning includes avoidance of concentrated sweets. He rarely participates in exercise. An ACE inhibitor/angiotensin II receptor blocker is being taken.       Objective     There were no vitals taken for this visit.     LAB  Lab Results   Component Value Date    BILITOT 0.7 02/02/2024    CALCIUM 10.2 02/02/2024    CO2 28 02/02/2024     02/02/2024    CREATININE 1.11 02/02/2024    GLUCOSE 129 (H) 02/02/2024    ALKPHOS 67 02/02/2024    K 4.7 02/02/2024    PROT 7.0 02/02/2024     02/02/2024    AST 15 02/02/2024    ALT 16 02/02/2024    BUN 18 02/02/2024    ANIONGAP 15 02/02/2024    ALBUMIN 4.6 02/02/2024    GFRMALE 89 08/08/2023     Lab Results   Component Value Date    TRIG 119 02/02/2024    CHOL 143 02/02/2024    LDLCALC 74 02/02/2024    HDL 45.2 02/02/2024     Lab Results   Component Value Date    HGBA1C 7.2 (H) 02/02/2024       Current Outpatient Medications  on File Prior to Visit   Medication Sig Dispense Refill    atorvastatin (Lipitor) 40 mg tablet TAKE 1 TABLET BY MOUTH ONCE DAILY 90 tablet 3    cholecalciferol, vitamin D3, (VITAMIN D3 ORAL) Take 1 tablet by mouth once daily.      dapaglifloz propaned-metformin (Xigduo XR) 5-1,000 mg TAKE 2 TABLETS BY MOUTH ONCE DAILY 180 tablet 3    fenofibrate (Triglide) 160 mg tablet TAKE 1 TABLET BY MOUTH ONCE DAILY 90 tablet 3    lisinopril 40 mg tablet Take 1 tablet (40 mg) by mouth once daily. 90 tablet 3    meloxicam (Mobic) 15 mg tablet TAKE 1 TABLET BY MOUTH ONCE DAILY WITH FOOD 90 tablet 3    omega-3/dha/epa/fish oil (OMEGA-3 FISH OIL ORAL) Take 2 tablets by mouth once daily.      semaglutide (Ozempic) 1 mg/dose (4 mg/3 mL) pen injector INJECT 1 MG UNDER THE SKIN ONCE WEEKLY 9 mL 3    [DISCONTINUED] blood sugar diagnostic (ONETOUCH ULTRA BLUE TEST STRIP MISC)       [DISCONTINUED] lancets 30 gauge misc       [DISCONTINUED] metFORMIN, MOD, (Glumetza) 500 mg 24 hr tablet Take 2 tablets (1,000 mg) by mouth once daily in the morning. Do not crush, chew, or split. 180 tablet 1     No current facility-administered medications on file prior to visit.        HISTORICAL PHARMACOTHERAPY (MED+REASON FOR DC)  -none    DRUG INTERACTIONS  - Drug X Disease: meloxican X hypertension - NSAIDs may exacerbate hypertension    SECONDARY PREVENTION  - Statin? yes  - ACE-I/ARB? yes    ASSESSMENT/PLAN:   Employee Health Diabetes Program (VBID)  - Patient enrolled in  Employee diabetes program for $0 co-pays on diabetes medications/supplies. Enrollment should be active in 2-4 weeks.  - Requested VBID enrollment date:   - PharmD Management Level:     Assessment/Plan   Problem List Items Addressed This Visit       Type 2 diabetes mellitus (CMS/[facility]) - Primary     Last A1c 7.2% 2/2/24. Above goal <7%.  Pharmacotherapy well optimized. Follows with [facility].  On Ozempic 1 mg weekly, Xigduo XR 5-1000 mg BID.  Does have occasional nausea and GI  discomfort day following Ozempic injection. Discussed strategies to mitigate.  Lipids at or near goal 2/2/24 on Atorvastatin 40 mg daily.  HTN not assessed at telehealth visit    Plan:  Continue all current pharmacotherapy per On license of [facility] clinic plan. Work on reducing pasta and other carbohydrate intake  Sending refills for testing supplies     Employee Health Diabetes Program (VBID)  - Patient enrolled in  Employee diabetes program for $0 co-pays on diabetes medications/supplies. Enrollment should be active in 2-4 weeks  - Requested VBID enrollment date: 2/20/24   - PharmD Management Level: 2                 Relevant Medications    blood sugar diagnostic (OneTouch Verio test strips) strip    lancets (OneTouch Delica Plus Lancet) 33 gauge misc      Follow up: 11 months for re-enrollment    Continue all meds under the continuation of care with the referring provider and clinical pharmacy team.    Rhys Manzano, PharmD     Verbal consent to manage patient's drug therapy was obtained from [the patient and/or an individual authorized to act on behalf of a patient]. They were informed they may decline to participate or withdraw from participation in pharmacy services at any time.

## 2024-02-21 ENCOUNTER — PHARMACY VISIT (OUTPATIENT)
Dept: PHARMACY | Facility: CLINIC | Age: 59
End: 2024-02-21
Payer: COMMERCIAL

## 2024-03-07 ENCOUNTER — PATIENT OUTREACH (OUTPATIENT)
Dept: CARE COORDINATION | Facility: CLINIC | Age: 59
End: 2024-03-07
Payer: COMMERCIAL

## 2024-04-04 PROCEDURE — RXMED WILLOW AMBULATORY MEDICATION CHARGE

## 2024-04-10 ENCOUNTER — PHARMACY VISIT (OUTPATIENT)
Dept: PHARMACY | Facility: CLINIC | Age: 59
End: 2024-04-10
Payer: COMMERCIAL

## 2024-04-29 PROCEDURE — RXMED WILLOW AMBULATORY MEDICATION CHARGE

## 2024-05-03 ENCOUNTER — PHARMACY VISIT (OUTPATIENT)
Dept: PHARMACY | Facility: CLINIC | Age: 59
End: 2024-05-03
Payer: COMMERCIAL

## 2024-05-06 PROCEDURE — RXMED WILLOW AMBULATORY MEDICATION CHARGE

## 2024-05-08 ENCOUNTER — PHARMACY VISIT (OUTPATIENT)
Dept: PHARMACY | Facility: CLINIC | Age: 59
End: 2024-05-08
Payer: COMMERCIAL

## 2024-05-11 PROCEDURE — RXMED WILLOW AMBULATORY MEDICATION CHARGE

## 2024-05-14 ENCOUNTER — PHARMACY VISIT (OUTPATIENT)
Dept: PHARMACY | Facility: CLINIC | Age: 59
End: 2024-05-14
Payer: COMMERCIAL

## 2024-05-15 DIAGNOSIS — E78.5 HYPERLIPIDEMIA, UNSPECIFIED HYPERLIPIDEMIA TYPE: ICD-10-CM

## 2024-05-15 PROCEDURE — RXMED WILLOW AMBULATORY MEDICATION CHARGE

## 2024-05-15 RX ORDER — FENOFIBRATE 160 MG/1
160 TABLET ORAL DAILY
Qty: 90 TABLET | Refills: 3 | Status: SHIPPED | OUTPATIENT
Start: 2024-05-15 | End: 2025-05-15

## 2024-05-15 RX ORDER — ATORVASTATIN CALCIUM 40 MG/1
40 TABLET, FILM COATED ORAL DAILY
Qty: 90 TABLET | Refills: 3 | Status: SHIPPED | OUTPATIENT
Start: 2024-05-15 | End: 2025-05-15

## 2024-05-16 ENCOUNTER — PHARMACY VISIT (OUTPATIENT)
Dept: PHARMACY | Facility: CLINIC | Age: 59
End: 2024-05-16
Payer: COMMERCIAL

## 2024-06-06 ENCOUNTER — PATIENT OUTREACH (OUTPATIENT)
Dept: CARE COORDINATION | Facility: CLINIC | Age: 59
End: 2024-06-06
Payer: COMMERCIAL

## 2024-06-06 NOTE — PROGRESS NOTES
Attempted outreach (active w [employer]); left msg on VM, provided my contact information for return call.

## 2024-06-20 ENCOUNTER — OFFICE VISIT (OUTPATIENT)
Dept: OPHTHALMOLOGY | Facility: CLINIC | Age: 59
End: 2024-06-20
Payer: COMMERCIAL

## 2024-06-20 DIAGNOSIS — H52.03 HYPERMETROPIA OF BOTH EYES: Primary | ICD-10-CM

## 2024-06-20 DIAGNOSIS — H52.223 REGULAR ASTIGMATISM OF BOTH EYES: ICD-10-CM

## 2024-06-20 DIAGNOSIS — H52.4 PRESBYOPIA: ICD-10-CM

## 2024-06-20 DIAGNOSIS — H02.59: ICD-10-CM

## 2024-06-20 DIAGNOSIS — H02.203: ICD-10-CM

## 2024-06-20 DIAGNOSIS — E78.5 TYPE 2 DIABETES MELLITUS WITH HYPERLIPIDEMIA (MULTI): ICD-10-CM

## 2024-06-20 DIAGNOSIS — E11.69 TYPE 2 DIABETES MELLITUS WITH HYPERLIPIDEMIA (MULTI): ICD-10-CM

## 2024-06-20 DIAGNOSIS — H02.051 TRICHIASIS WITHOUT ENTROPION OF RIGHT UPPER EYELID: ICD-10-CM

## 2024-06-20 PROCEDURE — V2522 CNTCT LENS HYDROPHIL BIFOCL: HCPCS | Performed by: OPTOMETRIST

## 2024-06-20 PROCEDURE — 92015 DETERMINE REFRACTIVE STATE: CPT | Performed by: OPTOMETRIST

## 2024-06-20 PROCEDURE — 92014 COMPRE OPH EXAM EST PT 1/>: CPT | Performed by: OPTOMETRIST

## 2024-06-20 PROCEDURE — FLVLF CONTACT LENS EVALUATION (SP): Performed by: OPTOMETRIST

## 2024-06-20 ASSESSMENT — VISUAL ACUITY
OD_CC: 20/20
VA_OR_OD_CURRENT_RX: 20/20-2
OS_CC: 20/20
CORRECTION_TYPE: GLASSES
OD_CC+: -2
OS_CC+: -1
METHOD: SNELLEN - LINEAR
VA_OR_OS_CURRENT_RX: 20/20-2

## 2024-06-20 ASSESSMENT — CONF VISUAL FIELD
OS_NORMAL: 1
OD_INFERIOR_NASAL_RESTRICTION: 0
OD_NORMAL: 1
OD_SUPERIOR_NASAL_RESTRICTION: 0
OS_INFERIOR_TEMPORAL_RESTRICTION: 0
OD_SUPERIOR_TEMPORAL_RESTRICTION: 0
OS_INFERIOR_NASAL_RESTRICTION: 0
METHOD: COUNTING FINGERS
OS_SUPERIOR_NASAL_RESTRICTION: 0
OD_INFERIOR_TEMPORAL_RESTRICTION: 0
OS_SUPERIOR_TEMPORAL_RESTRICTION: 0

## 2024-06-20 ASSESSMENT — REFRACTION_WEARINGRX
OD_ADD: +2.50
OD_CYLINDER: -0.50
OS_SPHERE: +2.75
SPECS_TYPE: PAL
OD_SPHERE: +3.00
OS_CYLINDER: SPHERE
OS_ADD: +2.50
OD_AXIS: 054

## 2024-06-20 ASSESSMENT — REFRACTION_MANIFEST
OS_SPHERE: +2.75
OD_ADD: +2.00
OS_CYLINDER: -0.50
OS_ADD: +2.00
OD_CYLINDER: SPHERE
OS_AXIS: 065
OD_SPHERE: +3.25
OS_CYLINDER: -0.50
METHOD_AUTOREFRACTION: 1
OD_CYLINDER: -0.50
OD_SPHERE: +3.25
OD_AXIS: 073
OS_AXIS: 054
OS_SPHERE: +3.25

## 2024-06-20 ASSESSMENT — SLIT LAMP EXAM - LIDS: COMMENTS: GOOD POSITION

## 2024-06-20 ASSESSMENT — REFRACTION_CURRENTRX
OS_BASECURVE: 8.6
OD_ADD: HI
OS_SPHERE: +3.00
OD_BASECURVE: 8.6
OS_DIAMETER: 14.2
OD_SPHERE: +3.00
OS_BRAND: AIR OPTIX MULTIFOCAL
OD_BRAND: AIR OPTIX MULTIFOCAL
OD_DIAMETER: 14.2
OS_ADD: HI

## 2024-06-20 ASSESSMENT — CUP TO DISC RATIO
OD_RATIO: .3
OS_RATIO: .35

## 2024-06-20 ASSESSMENT — TONOMETRY
OD_IOP_MMHG: 12
OS_IOP_MMHG: 12
IOP_METHOD: GOLDMANN APPLANATION

## 2024-06-20 ASSESSMENT — ENCOUNTER SYMPTOMS
CONSTITUTIONAL NEGATIVE: 0
MUSCULOSKELETAL NEGATIVE: 0
CARDIOVASCULAR NEGATIVE: 0
EYES NEGATIVE: 1
PSYCHIATRIC NEGATIVE: 0
HEMATOLOGIC/LYMPHATIC NEGATIVE: 0
ENDOCRINE NEGATIVE: 0
GASTROINTESTINAL NEGATIVE: 0
RESPIRATORY NEGATIVE: 0
ALLERGIC/IMMUNOLOGIC NEGATIVE: 0
NEUROLOGICAL NEGATIVE: 0

## 2024-06-20 ASSESSMENT — EXTERNAL EXAM - RIGHT EYE: OD_EXAM: NORMAL

## 2024-06-20 ASSESSMENT — EXTERNAL EXAM - LEFT EYE: OS_EXAM: NORMAL

## 2024-06-20 NOTE — PROGRESS NOTES
Assessment/Plan   Diagnoses and all orders for this visit:  Hypermetropia of both eyes  Regular astigmatism of both eyes  Presbyopia  New spec rx released today per patient request. Ocular health wnl for age OU. Monitor 1 year or sooner prn. Refraction billed today.  Discussed proper wear, care, replacement of contact lenses. Gave handout. D/c cl wear and RTC if eyes become red, painful, irritated. Monitor 1 year.   CL eval billed today. $35    Type 2 diabetes mellitus with hyperlipidemia (Multi)  The patient has diabetes without any evidence of retinopathy.  The patient was advised to maintain tight glucose control, tight blood pressure control, and favorable levels of cholesterol, low density lipoprotein, and high density lipoproteins.  Follow up in one year was recommended.    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 Option for qhs harrison / rx drop w/ worsening symptoms.

## 2024-06-20 NOTE — Clinical Note
Both eyes (OU) Contact Lens Order Contact Lens Current Rx    Current Contact Lens Rx      Brand Base Curve Diameter Sphere Add Dist VA Near VA Over-Sphere  Over-Dist VA   Right Air Optix Multifocal 8.6 14.2 +3.00 HI   Left Air Optix Multifocal 8.6 14.2 +3.00 HI        Quantity: 4 Package: 6 PK Appointment needed? No Medically necessary? No Ship To: Home Additional instructions: Please order year supply and mail to pt. Charges in [place]. Thanks!

## 2024-07-22 PROCEDURE — RXMED WILLOW AMBULATORY MEDICATION CHARGE

## 2024-07-27 ENCOUNTER — PHARMACY VISIT (OUTPATIENT)
Dept: PHARMACY | Facility: CLINIC | Age: 59
End: 2024-07-27
Payer: COMMERCIAL

## 2024-08-05 PROCEDURE — RXMED WILLOW AMBULATORY MEDICATION CHARGE

## 2024-08-09 ENCOUNTER — PHARMACY VISIT (OUTPATIENT)
Dept: PHARMACY | Facility: CLINIC | Age: 59
End: 2024-08-09
Payer: COMMERCIAL

## 2024-08-09 DIAGNOSIS — E78.5 TYPE 2 DIABETES MELLITUS WITH HYPERLIPIDEMIA (MULTI): ICD-10-CM

## 2024-08-09 DIAGNOSIS — E11.69 TYPE 2 DIABETES MELLITUS WITH HYPERLIPIDEMIA (MULTI): ICD-10-CM

## 2024-08-13 PROCEDURE — RXMED WILLOW AMBULATORY MEDICATION CHARGE

## 2024-08-17 ENCOUNTER — PHARMACY VISIT (OUTPATIENT)
Dept: PHARMACY | Facility: CLINIC | Age: 59
End: 2024-08-17
Payer: COMMERCIAL

## 2024-08-17 RX ORDER — DAPAGLIFLOZIN AND METFORMIN HYDROCHLORIDE 5; 1000 MG/1; MG/1
2 TABLET, FILM COATED, EXTENDED RELEASE ORAL DAILY
Qty: 180 TABLET | Refills: 3 | Status: SHIPPED | OUTPATIENT
Start: 2024-08-17 | End: 2025-08-17

## 2024-08-19 ENCOUNTER — PHARMACY VISIT (OUTPATIENT)
Dept: PHARMACY | Facility: CLINIC | Age: 59
End: 2024-08-19
Payer: COMMERCIAL

## 2024-08-19 PROCEDURE — RXMED WILLOW AMBULATORY MEDICATION CHARGE

## 2024-08-20 DIAGNOSIS — E11.9 TYPE 2 DIABETES MELLITUS WITHOUT COMPLICATION, WITHOUT LONG-TERM CURRENT USE OF INSULIN (MULTI): Primary | ICD-10-CM

## 2024-09-03 ENCOUNTER — LAB (OUTPATIENT)
Dept: LAB | Facility: LAB | Age: 59
End: 2024-09-03
Payer: COMMERCIAL

## 2024-09-03 DIAGNOSIS — E11.9 TYPE 2 DIABETES MELLITUS WITHOUT COMPLICATION, WITHOUT LONG-TERM CURRENT USE OF INSULIN (MULTI): ICD-10-CM

## 2024-09-03 LAB
ALBUMIN SERPL BCP-MCNC: 4.4 G/DL (ref 3.4–5)
ALP SERPL-CCNC: 70 U/L (ref 33–120)
ALT SERPL W P-5'-P-CCNC: 15 U/L (ref 10–52)
ANION GAP SERPL CALC-SCNC: 15 MMOL/L (ref 10–20)
AST SERPL W P-5'-P-CCNC: 16 U/L (ref 9–39)
BASOPHILS # BLD AUTO: 0.06 X10*3/UL (ref 0–0.1)
BASOPHILS NFR BLD AUTO: 1 %
BILIRUB SERPL-MCNC: 0.7 MG/DL (ref 0–1.2)
BUN SERPL-MCNC: 15 MG/DL (ref 6–23)
CALCIUM SERPL-MCNC: 9.5 MG/DL (ref 8.6–10.6)
CHLORIDE SERPL-SCNC: 101 MMOL/L (ref 98–107)
CHOLEST SERPL-MCNC: 178 MG/DL (ref 0–199)
CHOLESTEROL/HDL RATIO: 3.8
CO2 SERPL-SCNC: 28 MMOL/L (ref 21–32)
CREAT SERPL-MCNC: 1.05 MG/DL (ref 0.5–1.3)
CREAT UR-MCNC: 100.8 MG/DL (ref 20–370)
EGFRCR SERPLBLD CKD-EPI 2021: 82 ML/MIN/1.73M*2
EOSINOPHIL # BLD AUTO: 0.15 X10*3/UL (ref 0–0.7)
EOSINOPHIL NFR BLD AUTO: 2.4 %
ERYTHROCYTE [DISTWIDTH] IN BLOOD BY AUTOMATED COUNT: 12.7 % (ref 11.5–14.5)
EST. AVERAGE GLUCOSE BLD GHB EST-MCNC: 160 MG/DL
GLUCOSE SERPL-MCNC: 151 MG/DL (ref 74–99)
HBA1C MFR BLD: 7.2 %
HCT VFR BLD AUTO: 46.8 % (ref 41–52)
HDLC SERPL-MCNC: 47.4 MG/DL
HGB BLD-MCNC: 15.2 G/DL (ref 13.5–17.5)
IMM GRANULOCYTES # BLD AUTO: 0.01 X10*3/UL (ref 0–0.7)
IMM GRANULOCYTES NFR BLD AUTO: 0.2 % (ref 0–0.9)
LDLC SERPL CALC-MCNC: 97 MG/DL
LYMPHOCYTES # BLD AUTO: 1.67 X10*3/UL (ref 1.2–4.8)
LYMPHOCYTES NFR BLD AUTO: 26.8 %
MCH RBC QN AUTO: 30.2 PG (ref 26–34)
MCHC RBC AUTO-ENTMCNC: 32.5 G/DL (ref 32–36)
MCV RBC AUTO: 93 FL (ref 80–100)
MICROALBUMIN UR-MCNC: <7 MG/L
MICROALBUMIN/CREAT UR: NORMAL MG/G{CREAT}
MONOCYTES # BLD AUTO: 0.6 X10*3/UL (ref 0.1–1)
MONOCYTES NFR BLD AUTO: 9.6 %
NEUTROPHILS # BLD AUTO: 3.75 X10*3/UL (ref 1.2–7.7)
NEUTROPHILS NFR BLD AUTO: 60 %
NON HDL CHOLESTEROL: 131 MG/DL (ref 0–149)
NRBC BLD-RTO: 0 /100 WBCS (ref 0–0)
PLATELET # BLD AUTO: 333 X10*3/UL (ref 150–450)
POTASSIUM SERPL-SCNC: 4.6 MMOL/L (ref 3.5–5.3)
PROT SERPL-MCNC: 6.7 G/DL (ref 6.4–8.2)
RBC # BLD AUTO: 5.03 X10*6/UL (ref 4.5–5.9)
SODIUM SERPL-SCNC: 139 MMOL/L (ref 136–145)
TRIGL SERPL-MCNC: 169 MG/DL (ref 0–149)
VLDL: 34 MG/DL (ref 0–40)
WBC # BLD AUTO: 6.2 X10*3/UL (ref 4.4–11.3)

## 2024-09-03 PROCEDURE — 82570 ASSAY OF URINE CREATININE: CPT

## 2024-09-03 PROCEDURE — 85025 COMPLETE CBC W/AUTO DIFF WBC: CPT

## 2024-09-03 PROCEDURE — 82043 UR ALBUMIN QUANTITATIVE: CPT

## 2024-09-03 PROCEDURE — 80061 LIPID PANEL: CPT

## 2024-09-03 PROCEDURE — 83036 HEMOGLOBIN GLYCOSYLATED A1C: CPT

## 2024-09-03 PROCEDURE — 36415 COLL VENOUS BLD VENIPUNCTURE: CPT

## 2024-09-03 PROCEDURE — 80053 COMPREHEN METABOLIC PANEL: CPT

## 2024-09-04 ENCOUNTER — OFFICE VISIT (OUTPATIENT)
Dept: CARDIOLOGY | Facility: CLINIC | Age: 59
End: 2024-09-04
Payer: COMMERCIAL

## 2024-09-04 VITALS
BODY MASS INDEX: 24.92 KG/M2 | DIASTOLIC BLOOD PRESSURE: 84 MMHG | SYSTOLIC BLOOD PRESSURE: 138 MMHG | WEIGHT: 184 LBS | OXYGEN SATURATION: 98 % | HEART RATE: 62 BPM | HEIGHT: 72 IN

## 2024-09-04 DIAGNOSIS — E11.9 TYPE 2 DIABETES MELLITUS WITHOUT COMPLICATION, WITHOUT LONG-TERM CURRENT USE OF INSULIN (MULTI): Primary | ICD-10-CM

## 2024-09-04 DIAGNOSIS — I10 PRIMARY HYPERTENSION: ICD-10-CM

## 2024-09-04 DIAGNOSIS — Z72.0 TOBACCO ABUSE: ICD-10-CM

## 2024-09-04 PROCEDURE — 3051F HG A1C>EQUAL 7.0%<8.0%: CPT | Performed by: INTERNAL MEDICINE

## 2024-09-04 PROCEDURE — 99214 OFFICE O/P EST MOD 30 MIN: CPT | Performed by: INTERNAL MEDICINE

## 2024-09-04 PROCEDURE — 4010F ACE/ARB THERAPY RXD/TAKEN: CPT | Performed by: INTERNAL MEDICINE

## 2024-09-04 PROCEDURE — 3008F BODY MASS INDEX DOCD: CPT | Performed by: INTERNAL MEDICINE

## 2024-09-04 PROCEDURE — 3062F POS MACROALBUMINURIA REV: CPT | Performed by: INTERNAL MEDICINE

## 2024-09-04 PROCEDURE — 3079F DIAST BP 80-89 MM HG: CPT | Performed by: INTERNAL MEDICINE

## 2024-09-04 PROCEDURE — 3048F LDL-C <100 MG/DL: CPT | Performed by: INTERNAL MEDICINE

## 2024-09-04 PROCEDURE — 3075F SYST BP GE 130 - 139MM HG: CPT | Performed by: INTERNAL MEDICINE

## 2024-09-04 ASSESSMENT — ENCOUNTER SYMPTOMS
LOSS OF SENSATION IN FEET: 0
ALLERGIC/IMMUNOLOGIC NEGATIVE: 1
NEUROLOGICAL NEGATIVE: 1
OCCASIONAL FEELINGS OF UNSTEADINESS: 0
MUSCULOSKELETAL NEGATIVE: 1
RESPIRATORY NEGATIVE: 1
HEMATOLOGIC/LYMPHATIC NEGATIVE: 1
EYES NEGATIVE: 1
GASTROINTESTINAL NEGATIVE: 1
CONSTITUTIONAL NEGATIVE: 1
ENDOCRINE NEGATIVE: 1
DEPRESSION: 0
PSYCHIATRIC NEGATIVE: 1
CARDIOVASCULAR NEGATIVE: 1

## 2024-09-04 ASSESSMENT — PATIENT HEALTH QUESTIONNAIRE - PHQ9
2. FEELING DOWN, DEPRESSED OR HOPELESS: NOT AT ALL
1. LITTLE INTEREST OR PLEASURE IN DOING THINGS: NOT AT ALL
SUM OF ALL RESPONSES TO PHQ9 QUESTIONS 1 AND 2: 0

## 2024-09-04 ASSESSMENT — COLUMBIA-SUICIDE SEVERITY RATING SCALE - C-SSRS
6. HAVE YOU EVER DONE ANYTHING, STARTED TO DO ANYTHING, OR PREPARED TO DO ANYTHING TO END YOUR LIFE?: NO
1. IN THE PAST MONTH, HAVE YOU WISHED YOU WERE DEAD OR WISHED YOU COULD GO TO SLEEP AND NOT WAKE UP?: NO
2. HAVE YOU ACTUALLY HAD ANY THOUGHTS OF KILLING YOURSELF?: NO

## 2024-09-04 ASSESSMENT — PAIN SCALES - GENERAL: PAINLEVEL: 0-NO PAIN

## 2024-09-04 NOTE — PROGRESS NOTES
[facility] Integrated [facility] Novel Approaches in Vascular-Metabolic Disease ([facility]) Note    Reason for Visit: Follow-up visit  Referring Clinician: No ref. provider found     History of Present Illness: Mr. Zelaya is a 59-year-old gentleman with type 2 diabetes mellitus, hypertension, overweight, former smoker who was referred by the accountable care organization to the [facility] for cardiometabolic risk factor optimization and is here for a follow up visit. Since his last visit he has been doing well without any intercurrent health events or hospitalizations. His glycemic control has been stable and his most recent A1c is 7.2%. He is tolerating his medications well without any adverse effects. He prefers to use a glucometer for finger checks versus a CGM device.  He does have occasional readings in the 200s but attributes this to eating fair amounts of pasta and bread while working in [employer].  He does not have any hypoglycemia.  He is adherent to his medications without any adverse effects.  His blood pressure has been improved since increasing the dose of lisinopril and is usually in the 120s over 80s in the ambulatory setting.  He does not have any exertional chest pain, shortness of breath, palpitations, or syncope.  He continues to smoke cigars on occasion and is in the precontemplative phase of quitting.    Past Medical History:  He has a past medical history of Acute upper respiratory infection, unspecified (04/02/2018), Acute upper respiratory infection, unspecified (03/25/2016), Disorder of the skin and subcutaneous tissue, unspecified (08/10/2019), Encounter for screening for malignant neoplasm of colon (12/17/2018), Personal history of neoplasm of uncertain behavior (02/26/2021), Personal history of other diseases of the musculoskeletal system and connective tissue (03/03/2015), Personal history of other drug therapy, Plantar fascial fibromatosis (02/19/2019), Trichiasis without entropion of right  upper eyelid, Type 2 diabetes mellitus without complications (Multi) (12/10/2021), and Unspecified adverse effect of drug or medicament, initial encounter (CODE) (04/03/2015).    Past Surgical History:  He has a past surgical history that includes Appendectomy (07/08/2018).    Social History:  He reports that he has been smoking cigars. He has been exposed to tobacco smoke. He has never used smokeless tobacco. He reports current alcohol use of about 4.0 standard drinks of alcohol per week. He reports that he does not use drugs.    Family History:  Family History   Problem Relation Name Age of Onset    Lung disease Mother      Other (cardiac disorder) Father      Glaucoma Neg Hx      Macular degeneration Neg Hx         Allergies:  Patient has no known allergies.    Outpatient Medications:  Current Outpatient Medications   Medication Instructions    atorvastatin (Lipitor) 40 mg tablet TAKE 1 TABLET BY MOUTH ONCE DAILY    blood sugar diagnostic (OneTouch Verio test strips) strip Use to test blood sugar twice daily    cholecalciferol, vitamin D3, (VITAMIN D3 ORAL) 1 tablet, oral, Daily    dapaglifloz propaned-metformin (Xigduo XR) 5-1,000 mg 2 tablets, oral, Daily    fenofibrate (Triglide) 160 mg tablet TAKE 1 TABLET BY MOUTH ONCE DAILY    lancets (OneTouch Delica Plus Lancet) 33 gauge misc 1 Lancet, miscellaneous, 2 times daily    lisinopril 40 mg, oral, Daily    omega-3/dha/epa/fish oil (OMEGA-3 FISH OIL ORAL) 2 tablets, oral, Daily    semaglutide (Ozempic) 1 mg/dose (4 mg/3 mL) pen injector INJECT 1 MG UNDER THE SKIN ONCE WEEKLY       Review of Systems:  Review of Systems   Constitutional: Negative.   HENT: Negative.     Eyes: Negative.    Cardiovascular: Negative.    Respiratory: Negative.     Endocrine: Negative.    Hematologic/Lymphatic: Negative.    Skin: Negative.    Musculoskeletal: Negative.    Gastrointestinal: Negative.    Genitourinary: Negative.    Neurological: Negative.    Psychiatric/Behavioral:  Negative.     Allergic/Immunologic: Negative.        Last Recorded Vitals:  Vitals:    09/04/24 0806   BP: 138/84   BP Location: Left arm   Patient Position: Sitting   BP Cuff Size: Large adult   Pulse: 62   SpO2: 98%   Weight: 83.5 kg (184 lb)   Height: 1.829 m (6')       Physical Examination:  General: Well appearing, well-nourished, in no acute distress.  HEENT: Normocephalic atraumatic, pupils equal and reactive to light, extraocular muscles intact, no conjunctival injection, oropharynx clear without exudates.  Neck: Normal carotid arterial pulses, no arterial bruits, no thyromegaly.  Cardiac: Regular rhythm and normal heart rate.  S1, S2 present and normal.  No murmurs, rubs, or gallops.  PMI is nondisplaced. Jugular venous pulsations are normal.  Pulmonary: Normal breath sounds, no increased work of breathing, no wheezes or crackles.  GI: Normal bowel sounds, abdominal aorta not enlarged, no hepatosplenomegaly, no abdominal bruits.  Lower extremities: No cyanosis, clubbing, or edema.  No xanthelasma present. Normal distal pulses.  Skin: Skin intact. No significant rashes or lesions present.  Neuro: Alert and oriented x 3, normal attention and cognition, no focal motor or sensory neurologic deficits.  Psych: Normal affect and mood.  Musculoskeletal: Normal gait normal muscle tone.    Laboratory Studies:  Lab Results   Component Value Date    GLUCOSE 151 (H) 09/03/2024    CALCIUM 9.5 09/03/2024     09/03/2024    K 4.6 09/03/2024    CO2 28 09/03/2024     09/03/2024    BUN 15 09/03/2024    CREATININE 1.05 09/03/2024     Lab Results   Component Value Date    ALT 15 09/03/2024    AST 16 09/03/2024    ALKPHOS 70 09/03/2024    BILITOT 0.7 09/03/2024     Results from last 7 days   Lab Units 09/03/24  0754   CHOLESTEROL mg/dL 178   TRIGLYCERIDES mg/dL 169*   HDL mg/dL 47.4     Lab Results   Component Value Date    CHOL 178 09/03/2024    CHOL 143 02/02/2024    CHOL 180 08/08/2023     Lab Results   Component  "Value Date    HDL 47.4 09/03/2024    HDL 45.2 02/02/2024    HDL 53.5 08/08/2023     Lab Results   Component Value Date    LDLCALC 97 09/03/2024    LDLCALC 74 02/02/2024     Lab Results   Component Value Date    TRIG 169 (H) 09/03/2024    TRIG 119 02/02/2024    TRIG 169 (H) 08/08/2023     No components found for: \"CHOLHDL\"  Lab Results   Component Value Date    HGBA1C 7.2 (H) 09/03/2024     UACR undetectable    FIB-4 Calculation: 0.73 at 9/3/2024  7:54 AM  Calculated from:  SGOT/AST: 16 U/L at 9/3/2024  7:54 AM  SGPT/ALT: 15 U/L at 9/3/2024  7:54 AM  Platelets: 333 x10*3/uL at 9/3/2024  7:54 AM  Age: 59 years      Assessment and Plan:  Problem List Items Addressed This Visit          Cardiac and Vasculature    Hypertension       Endocrine/Metabolic    Type 2 diabetes mellitus (Multi) - Primary     Other Visit Diagnoses       Tobacco abuse              Mr. Zelaya is a 59-year-old gentleman with type 2 diabetes mellitus, hypertension, overweight, former smoker who was referred by the accountable care organization to the [facility] for cardiometabolic risk factor optimization and is here for a follow up visit.  Since his last visit he has maintained his risk factor levels and continues to have mild difficulty with hyperglycemia related to dietary indiscretion.  We discussed dietary modification and redoubling efforts as he would like to try this before consideration of increasing his GLP-1 receptor agonist or adding another medication such as a sulfonylurea.  His blood pressure and lipids are well-controlled on current therapy. I did not make any other changes to his medical regimen today. We will see him again in 1 year here in the office for routine follow-up with repeat laboratory studies at that time. Please do not hesitate to call or contact me with questions or concerns.     I spent 35 minutes of face-to-face time with the patient, with more than 50% of that time spent in counseling and/or coordination of care.    [place] " TOY Oconnor MD, FAJUAN R, FACC  Director,  [facility] Cardiovascular Prevention  Director,  [facility]  Associate Professor of Medicine  [facility] [employer]

## 2024-09-05 ENCOUNTER — PATIENT OUTREACH (OUTPATIENT)
Dept: CARE COORDINATION | Facility: CLINIC | Age: 59
End: 2024-09-05
Payer: COMMERCIAL

## 2024-09-05 NOTE — CARE PLAN
See below.  Will close current Case mgt episode at this time.      Problem: Access to Care Issue  Goal: Assess and Address Access Barriers  Intervention: Confirm adherence with follow up care (labs, tests, studies) and scheduled appointments per provider/specialist instruction  Note: Attempted outreach; left msg on VM and congratulated on graduation from Regroup Therapy program (OK to leave detailed msgs on VM per pt on 6.7.23).  Encouraged to call for any issues or concerns that may arise in the future and provided my contact information for same.  Due for PCP follow up visit; recently seen for Opthalmology visit.

## 2024-10-12 PROCEDURE — RXMED WILLOW AMBULATORY MEDICATION CHARGE

## 2024-10-21 ENCOUNTER — PHARMACY VISIT (OUTPATIENT)
Dept: PHARMACY | Facility: CLINIC | Age: 59
End: 2024-10-21
Payer: COMMERCIAL

## 2024-11-02 PROCEDURE — RXMED WILLOW AMBULATORY MEDICATION CHARGE

## 2024-11-11 ENCOUNTER — PHARMACY VISIT (OUTPATIENT)
Dept: PHARMACY | Facility: CLINIC | Age: 59
End: 2024-11-11
Payer: COMMERCIAL

## 2024-11-11 PROCEDURE — RXMED WILLOW AMBULATORY MEDICATION CHARGE

## 2024-11-18 ENCOUNTER — PHARMACY VISIT (OUTPATIENT)
Dept: PHARMACY | Facility: CLINIC | Age: 59
End: 2024-11-18
Payer: COMMERCIAL

## 2024-11-20 ENCOUNTER — APPOINTMENT (OUTPATIENT)
Dept: PRIMARY CARE | Facility: CLINIC | Age: 59
End: 2024-11-20
Payer: COMMERCIAL

## 2024-11-20 VITALS
OXYGEN SATURATION: 94 % | BODY MASS INDEX: 24.91 KG/M2 | DIASTOLIC BLOOD PRESSURE: 71 MMHG | HEART RATE: 87 BPM | RESPIRATION RATE: 16 BRPM | SYSTOLIC BLOOD PRESSURE: 114 MMHG | WEIGHT: 183.9 LBS | HEIGHT: 72 IN

## 2024-11-20 DIAGNOSIS — Z00.00 ANNUAL PHYSICAL EXAM: Primary | ICD-10-CM

## 2024-11-20 DIAGNOSIS — E11.69 TYPE 2 DIABETES MELLITUS WITH HYPERLIPIDEMIA (MULTI): ICD-10-CM

## 2024-11-20 DIAGNOSIS — E78.5 TYPE 2 DIABETES MELLITUS WITH HYPERLIPIDEMIA (MULTI): ICD-10-CM

## 2024-11-20 DIAGNOSIS — M21.612 BUNION OF LEFT FOOT: ICD-10-CM

## 2024-11-20 PROCEDURE — 4010F ACE/ARB THERAPY RXD/TAKEN: CPT | Performed by: INTERNAL MEDICINE

## 2024-11-20 PROCEDURE — 3008F BODY MASS INDEX DOCD: CPT | Performed by: INTERNAL MEDICINE

## 2024-11-20 PROCEDURE — 3048F LDL-C <100 MG/DL: CPT | Performed by: INTERNAL MEDICINE

## 2024-11-20 PROCEDURE — 3074F SYST BP LT 130 MM HG: CPT | Performed by: INTERNAL MEDICINE

## 2024-11-20 PROCEDURE — 3078F DIAST BP <80 MM HG: CPT | Performed by: INTERNAL MEDICINE

## 2024-11-20 PROCEDURE — 3062F POS MACROALBUMINURIA REV: CPT | Performed by: INTERNAL MEDICINE

## 2024-11-20 PROCEDURE — 3051F HG A1C>EQUAL 7.0%<8.0%: CPT | Performed by: INTERNAL MEDICINE

## 2024-11-20 PROCEDURE — 99396 PREV VISIT EST AGE 40-64: CPT | Performed by: INTERNAL MEDICINE

## 2024-11-20 ASSESSMENT — ENCOUNTER SYMPTOMS
BLOOD IN STOOL: 0
DIZZINESS: 0
LIGHT-HEADEDNESS: 1
SLEEP DISTURBANCE: 1
FATIGUE: 0
CONSTIPATION: 0
HEADACHES: 0
DIARRHEA: 1
SHORTNESS OF BREATH: 0

## 2024-11-20 ASSESSMENT — PATIENT HEALTH QUESTIONNAIRE - PHQ9
2. FEELING DOWN, DEPRESSED OR HOPELESS: NOT AT ALL
SUM OF ALL RESPONSES TO PHQ9 QUESTIONS 1 AND 2: 0
1. LITTLE INTEREST OR PLEASURE IN DOING THINGS: NOT AT ALL

## 2024-11-20 NOTE — PROGRESS NOTES
Subjective   Patient ID: Jose E Zelaya is a 59 y.o. male who presents for Establish Care (Foot problems, possible bunion concerns. Hx of injury 6-7 weeks ago.).    Pt presents to get established from Dr. Gibbons office and for physical.    PMH:  -DM2: Is taking metformin, dapaglifozin, and ozempic 1mg weekly. A1c is 7.2%. Pt finds the first 3-4 days after he uses ozempic he eats much less since he has diminished appetite and upset stomach.  -HLD: Taking statin, fenofibrate, and fish oil.  -HTN: Taking lisinopril.    Sleeps around 6-7 hours a night. Will wake up throughout the night.    Dropped a bar onto his R foot a few weeks ago. Already had a bunion there that was non-painful, but now pt gets pain along that area when walking. Can also happen at rest. Has taken ibuprofen which helps. Pain is the same today as it was when this first got injured.        Review of Systems   Constitutional:  Negative for fatigue.   Respiratory:  Negative for shortness of breath.    Cardiovascular:  Negative for chest pain.   Gastrointestinal:  Positive for diarrhea (occasionally from metformin). Negative for blood in stool and constipation.   Neurological:  Positive for light-headedness (first couple days after using ozempic). Negative for dizziness and headaches.   Psychiatric/Behavioral:  Positive for sleep disturbance.        /71 (BP Location: Right arm, Patient Position: Sitting)   Pulse 87   Resp 16   Ht 1.829 m (6')   Wt 83.4 kg (183 lb 14.4 oz)   SpO2 94%   BMI 24.94 kg/m²   Objective   Physical Exam  Constitutional:       General: He is not in acute distress.     Appearance: He is not ill-appearing, toxic-appearing or diaphoretic.   HENT:      Head: Normocephalic and atraumatic.   Eyes:      General: No scleral icterus.     Conjunctiva/sclera: Conjunctivae normal.   Cardiovascular:      Rate and Rhythm: Normal rate and regular rhythm.      Heart sounds: No murmur heard.     No friction rub. No gallop.   Pulmonary:       Effort: Pulmonary effort is normal. No respiratory distress.      Breath sounds: No stridor. No wheezing, rhonchi or rales.   Abdominal:      General: Abdomen is flat. Bowel sounds are normal. There is no distension.      Palpations: Abdomen is soft.      Tenderness: There is no abdominal tenderness. There is no guarding.   Musculoskeletal:         General: Tenderness (L foot with medial bunion with TTP) present.      Cervical back: Normal range of motion and neck supple. No tenderness.   Lymphadenopathy:      Cervical: No cervical adenopathy.   Skin:     General: Skin is warm and dry.   Neurological:      Mental Status: He is alert.         Assessment/Plan   Problem List Items Addressed This Visit             ICD-10-CM    Type 2 diabetes mellitus with hyperlipidemia (Multi) E11.69, E78.5     -Pt taking metformin, dapagliflozin, and ozempic 1mg weekly. Will recheck A1c and see back in 3 months. Pt agreeable.          Other Visit Diagnoses         Codes    Annual physical exam    -  Primary Z00.00    Bunion of left foot     M21.612    Relevant Orders    XR foot left 3+ views    Referral to Podiatry        -Pt dropped a bar on his L foot and has had pain there since. Pt will take ibuprofen for 2 weeks and ice the area. If pain hasn't improved, then pt to get XR and see podiatry. Refills placed.  -Flu shot declined. Pt wants to check to see if shingles is covered by his insurance first.       Jef Cunningham MD 11/20/24 3:44 PM

## 2024-11-20 NOTE — ASSESSMENT & PLAN NOTE
-Pt taking metformin, dapagliflozin, and ozempic 1mg weekly. Will recheck A1c and see back in 3 months. Pt agreeable.

## 2025-01-06 DIAGNOSIS — E78.5 TYPE 2 DIABETES MELLITUS WITH HYPERLIPIDEMIA (MULTI): ICD-10-CM

## 2025-01-06 DIAGNOSIS — E11.69 TYPE 2 DIABETES MELLITUS WITH HYPERLIPIDEMIA (MULTI): ICD-10-CM

## 2025-01-06 PROCEDURE — RXMED WILLOW AMBULATORY MEDICATION CHARGE

## 2025-01-06 RX ORDER — SEMAGLUTIDE 1.34 MG/ML
1 INJECTION, SOLUTION SUBCUTANEOUS WEEKLY
Qty: 9 ML | Refills: 3 | Status: SHIPPED | OUTPATIENT
Start: 2025-01-06 | End: 2026-01-05

## 2025-01-10 ENCOUNTER — PHARMACY VISIT (OUTPATIENT)
Dept: PHARMACY | Facility: CLINIC | Age: 60
End: 2025-01-10
Payer: COMMERCIAL

## 2025-01-21 ENCOUNTER — APPOINTMENT (OUTPATIENT)
Dept: PHARMACY | Facility: HOSPITAL | Age: 60
End: 2025-01-21
Payer: COMMERCIAL

## 2025-01-22 NOTE — PROGRESS NOTES
[facility] (VB[facility])    Jose E Zelaya is a 59 y.o. male referred to Clinical Pharmacy Team to complete a comprehensive medication review (CMR) with a pharmacist as part of the [employer] (VBID) diabetes program.  Pharmacy team may also provide assistance in diabetes management per discussion with referring provider and/or endocrinology. Referring Provider: Wilton Gibbons, DO    Does patient follow with Endocrinology: No  Sees Cardiology - [facility], Dr. Davidson Oconnor    Subjective   No Known Allergies    [facility]  [address] [place] [address], [address] [address]  Phone: [phone] Fax: [phone]    Diabetes  He presents for his follow-up diabetic visit. He has type 2 diabetes mellitus. His disease course has been stable. There are no hypoglycemic complications. Risk factors for coronary artery disease include diabetes mellitus and male sex. An ACE inhibitor/angiotensin II receptor blocker is being taken.     Pertinent PMH Review:  PMH of Pancreatitis: No  PMH of Retinopathy: No  PMH of Urinary Tract Infections: No  PMH of MTC: No    HOME MONITORING  Monitoring Device: Fingerstick glucometer, OneTouch Verio  Monitoring Frequency: PRN, in the AM 3-4 x weekly    Current home BG readings: AM FBG - reports high, 150-200 mg/dL, 140 after meals  Any episodes of hypoglycemia? Yes, denies .      Objective     BP Readings from Last 6 Encounters:   11/20/24 114/71   09/04/24 138/84   02/07/24 152/89   09/26/23 112/62   02/28/23 120/60   11/15/22 112/60      Daily Weight  11/20/24 : 83.4 kg (183 lb 14.4 oz)  09/04/24 : 83.5 kg (184 lb)  02/07/24 : 85.7 kg (189 lb)  09/26/23 : 86.9 kg (191 lb 9.6 oz)  02/28/23 : 91.9 kg (202 lb 8 oz)  11/15/22 : 94.3 kg (208 lb)     LAB  Lab Results   Component Value Date    BILITOT 0.7 09/03/2024    CALCIUM 9.5 09/03/2024    CO2 28 09/03/2024     09/03/2024    CREATININE 1.05 09/03/2024    GLUCOSE 151 (H) 09/03/2024     ALKPHOS 70 09/03/2024    K 4.6 09/03/2024    PROT 6.7 09/03/2024     09/03/2024    AST 16 09/03/2024    ALT 15 09/03/2024    BUN 15 09/03/2024    ANIONGAP 15 09/03/2024    ALBUMIN 4.4 09/03/2024    GFRMALE 89 08/08/2023     Lab Results   Component Value Date    TRIG 169 (H) 09/03/2024    CHOL 178 09/03/2024    LDLCALC 97 09/03/2024    HDL 47.4 09/03/2024     Lab Results   Component Value Date    HGBA1C 7.2 (H) 09/03/2024     Estimated body mass index is 24.94 kg/m² as calculated from the following:    Height as of 11/20/24: 1.829 m (6').    Weight as of 11/20/24: 83.4 kg (183 lb 14.4 oz).     Current Outpatient Medications on File Prior to Visit   Medication Sig Dispense Refill    atorvastatin (Lipitor) 40 mg tablet TAKE 1 TABLET BY MOUTH ONCE DAILY 90 tablet 3    cholecalciferol, vitamin D3, (VITAMIN D3 ORAL) Take 1 tablet by mouth once daily.      dapaglifloz propaned-metformin (Xigduo XR) 5-1,000 mg Take 2 tablets by mouth once daily. 180 tablet 3    fenofibrate (Triglide) 160 mg tablet TAKE 1 TABLET BY MOUTH ONCE DAILY 90 tablet 3    lisinopril 40 mg tablet Take 1 tablet (40 mg) by mouth once daily. 90 tablet 3    omega-3/dha/epa/fish oil (OMEGA-3 FISH OIL ORAL) Take 2 tablets by mouth once daily.      semaglutide (Ozempic) 1 mg/dose (4 mg/3 mL) pen injector Inject 1 mg under the skin 1 (one) time per week. 9 mL 3    [DISCONTINUED] blood sugar diagnostic (OneTouch Verio test strips) strip Use to test blood sugar twice daily 100 strip 1    [DISCONTINUED] lancets (OneTouch Delica Plus Lancet) 33 gauge misc 1 Lancet 2 times a day. 100 each 1    [DISCONTINUED] metFORMIN, MOD, (Glumetza) 500 mg 24 hr tablet Take 2 tablets (1,000 mg) by mouth once daily in the morning. Do not crush, chew, or split. 180 tablet 1     No current facility-administered medications on file prior to visit.      MEDICATION RECONCILIATION  Added: none  Changed: none  Removed: none    Drug Interactions:  None warranting change in therapy  at this time    CURRENT DIABETES PHARMACOTHERAPY  Xigduo XR (dapagliflozin 5 mg - 1000 mg) - two tabs PO daily  Ozempic 1 mg injected subcutaneously once weekly    PREVENTATIVE PHARMACOTHERAPY  Statin? yes - atorvastatin 40, fenofibrate, omega-3  LDL: not at goal (< 70 mg/dL)  ACE-I/ARB? yes  BP: at goal, < 130/80  UACR: normal (< 30 mg/g)  Aspirin?  no    Assessment/Plan   Problem List Items Addressed This Visit       Type 2 diabetes mellitus with hyperlipidemia (Multi) - Primary    Relevant Medications    OneTouch Verio test strips strip    OneTouch Delica Plus Lancet 33 gauge misc    Other Relevant Orders    Referral to Clinical Pharmacy      Diabetes:  Patient's Type 2 diabetes is uncontrolled but stable with most recent A1c of 7.2% on 9/3/24 (Goal < 7%). Overall, doing well on current regimen, denies side effects. No changes recommended at today's visit.   Continue:   Xigduo XR (dapagliflozin 5 mg - 1000 mg) - two tabs PO daily  Ozempic 1 mg injected subcutaneously once weekly  Comorbidity/Complication Regimen: appropriate  Vaccinations  Recommended vaccines: Shingrix 2 dose series, Flu, Tdap (last 1/2015), Hep B, updated COVID-19   All $0 w/  Employee Health Plan    Comprehensive Medication Review:  Reviewed all medications on patient medication list in EMR. Discussed indication, administration, MOA and possible side effects to medications. Answered all patient questions and concerns.   Patient denies side effects with medications.   Adherence is expected to be Good.   Additional concerns with medication list: none    [employer] Employee Diabetes Program Enrollment: Renewal  Patient enrolled in  Employee diabetes program for $0 co-pays on diabetes medications/supplies. Renewal should be active in 2-4 weeks.  Requested VBID enrollment date: 1/23/25  PharmD Management Level: Level 2   Pharmacy fill location:  [facility]    Follow up: 10-12 months for renewal    Purnima Johnson, MaheshD     Continue all  meds under the continuation of care with the referring provider and clinical pharmacy team. Patient/Caregiver was informed they may decline to participate or withdraw from participation in pharmacy services at any time.

## 2025-01-23 ENCOUNTER — TELEMEDICINE (OUTPATIENT)
Dept: PHARMACY | Facility: HOSPITAL | Age: 60
End: 2025-01-23
Payer: COMMERCIAL

## 2025-01-23 DIAGNOSIS — E78.5 TYPE 2 DIABETES MELLITUS WITH HYPERLIPIDEMIA (MULTI): Primary | ICD-10-CM

## 2025-01-23 DIAGNOSIS — E11.69 TYPE 2 DIABETES MELLITUS WITH HYPERLIPIDEMIA (MULTI): Primary | ICD-10-CM

## 2025-01-23 PROCEDURE — RXMED WILLOW AMBULATORY MEDICATION CHARGE

## 2025-01-23 RX ORDER — BLOOD-GLUCOSE METER
EACH MISCELLANEOUS
Qty: 100 EACH | Refills: 11 | Status: SHIPPED | OUTPATIENT
Start: 2025-01-23

## 2025-01-23 RX ORDER — LANCETS 33 GAUGE
EACH MISCELLANEOUS
Qty: 100 EACH | Refills: 11 | Status: SHIPPED | OUTPATIENT
Start: 2025-01-23

## 2025-01-31 ENCOUNTER — PHARMACY VISIT (OUTPATIENT)
Dept: PHARMACY | Facility: CLINIC | Age: 60
End: 2025-01-31
Payer: COMMERCIAL

## 2025-02-03 DIAGNOSIS — I10 PRIMARY HYPERTENSION: ICD-10-CM

## 2025-02-03 PROCEDURE — RXMED WILLOW AMBULATORY MEDICATION CHARGE

## 2025-02-03 RX ORDER — LISINOPRIL 40 MG/1
40 TABLET ORAL DAILY
Qty: 90 TABLET | Refills: 3 | Status: SHIPPED | OUTPATIENT
Start: 2025-02-03 | End: 2026-02-03

## 2025-02-10 ENCOUNTER — PHARMACY VISIT (OUTPATIENT)
Dept: PHARMACY | Facility: CLINIC | Age: 60
End: 2025-02-10
Payer: COMMERCIAL

## 2025-02-18 ENCOUNTER — APPOINTMENT (OUTPATIENT)
Dept: PHARMACY | Facility: HOSPITAL | Age: 60
End: 2025-02-18
Payer: COMMERCIAL

## 2025-02-19 PROCEDURE — RXMED WILLOW AMBULATORY MEDICATION CHARGE

## 2025-02-24 ENCOUNTER — PHARMACY VISIT (OUTPATIENT)
Dept: PHARMACY | Facility: CLINIC | Age: 60
End: 2025-02-24
Payer: COMMERCIAL

## 2025-02-25 DIAGNOSIS — Z00.00 ROUTINE GENERAL MEDICAL EXAMINATION AT A HEALTH CARE FACILITY: Primary | ICD-10-CM

## 2025-03-04 LAB
ALBUMIN/CREAT UR: NORMAL MG/G CREAT
CREAT UR-MCNC: 69 MG/DL (ref 20–320)
EST. AVERAGE GLUCOSE BLD GHB EST-MCNC: 160 MG/DL
EST. AVERAGE GLUCOSE BLD GHB EST-SCNC: 8.9 MMOL/L
HBA1C MFR BLD: 7.2 % OF TOTAL HGB
MICROALBUMIN UR-MCNC: <0.2 MG/DL
PSA SERPL-MCNC: 0.83 NG/ML

## 2025-03-06 ENCOUNTER — APPOINTMENT (OUTPATIENT)
Dept: PRIMARY CARE | Facility: CLINIC | Age: 60
End: 2025-03-06
Payer: COMMERCIAL

## 2025-03-06 VITALS
OXYGEN SATURATION: 96 % | BODY MASS INDEX: 25.43 KG/M2 | WEIGHT: 187.5 LBS | HEART RATE: 77 BPM | SYSTOLIC BLOOD PRESSURE: 114 MMHG | RESPIRATION RATE: 18 BRPM | DIASTOLIC BLOOD PRESSURE: 69 MMHG

## 2025-03-06 DIAGNOSIS — E78.5 TYPE 2 DIABETES MELLITUS WITH HYPERLIPIDEMIA (MULTI): Primary | ICD-10-CM

## 2025-03-06 DIAGNOSIS — E11.69 TYPE 2 DIABETES MELLITUS WITH HYPERLIPIDEMIA (MULTI): Primary | ICD-10-CM

## 2025-03-06 PROCEDURE — 3074F SYST BP LT 130 MM HG: CPT | Performed by: INTERNAL MEDICINE

## 2025-03-06 PROCEDURE — RXMED WILLOW AMBULATORY MEDICATION CHARGE

## 2025-03-06 PROCEDURE — 99213 OFFICE O/P EST LOW 20 MIN: CPT | Performed by: INTERNAL MEDICINE

## 2025-03-06 PROCEDURE — 4010F ACE/ARB THERAPY RXD/TAKEN: CPT | Performed by: INTERNAL MEDICINE

## 2025-03-06 PROCEDURE — 3078F DIAST BP <80 MM HG: CPT | Performed by: INTERNAL MEDICINE

## 2025-03-06 RX ORDER — DEXTROSE 4 G
TABLET,CHEWABLE ORAL
Qty: 1 EACH | Refills: 0 | Status: SHIPPED | OUTPATIENT
Start: 2025-03-06

## 2025-03-06 ASSESSMENT — ENCOUNTER SYMPTOMS: NUMBNESS: 0

## 2025-03-06 NOTE — ASSESSMENT & PLAN NOTE
-Pt stable on regimen. A1c unchanged at 7.2%. Is working w/ pharmacy to improve numbers. Last note reviewed.  -Maximized on two meds, but I fear going up on ozempic dose given pt's weight. He agrees he would rather not lose any more weight at this point. We agreed to have pt meet with a nutritionist next to see what dietary changes pt can make to help with numbers. To continue working with pharmacy. Refilled glucometer today.

## 2025-03-06 NOTE — PROGRESS NOTES
Subjective   Patient ID: Jose E Zelaya is a 60 y.o. male who presents for Follow-up.    Pt here for follow up of diabetes.    Pt says for last 5 weeks his BS average went from the 200s down to the 150s. Has been drinking more water. Won't eat breakfast, but will usually eat lunch and dinner with a few snacks during the day. Has cut back on snacking pretzels and chips.        Review of Systems   Neurological:  Negative for numbness.       /69 (BP Location: Right arm, Patient Position: Sitting)   Pulse 77   Resp 18   Wt 85 kg (187 lb 8 oz)   SpO2 96%   BMI 25.43 kg/m²   Objective   Physical Exam  Constitutional:       General: He is not in acute distress.     Appearance: He is not ill-appearing, toxic-appearing or diaphoretic.   HENT:      Head: Normocephalic and atraumatic.   Eyes:      Conjunctiva/sclera: Conjunctivae normal.   Neurological:      Mental Status: He is alert.         Assessment/Plan   Problem List Items Addressed This Visit             ICD-10-CM    Type 2 diabetes mellitus with hyperlipidemia (Multi) - Primary E11.69, E78.5     -Pt stable on regimen. A1c unchanged at 7.2%. Is working w/ pharmacy to improve numbers. Last note reviewed.  -Maximized on two meds, but I fear going up on ozempic dose given pt's weight. He agrees he would rather not lose any more weight at this point. We agreed to have pt meet with a nutritionist next to see what dietary changes pt can make to help with numbers. To continue working with pharmacy. Refilled glucometer today.         Relevant Medications    blood-glucose meter (OneTouch Verio Flex meter) misc    Other Relevant Orders    Referral to Nutrition Services            Jef Cunningham MD 03/06/25 2:37 PM

## 2025-03-19 PROCEDURE — RXMED WILLOW AMBULATORY MEDICATION CHARGE

## 2025-03-24 ENCOUNTER — PHARMACY VISIT (OUTPATIENT)
Dept: PHARMACY | Facility: CLINIC | Age: 60
End: 2025-03-24
Payer: COMMERCIAL

## 2025-03-28 PROCEDURE — RXMED WILLOW AMBULATORY MEDICATION CHARGE

## 2025-04-04 ENCOUNTER — PHARMACY VISIT (OUTPATIENT)
Dept: PHARMACY | Facility: CLINIC | Age: 60
End: 2025-04-04
Payer: COMMERCIAL

## 2025-05-05 PROCEDURE — RXMED WILLOW AMBULATORY MEDICATION CHARGE

## 2025-05-12 ENCOUNTER — PHARMACY VISIT (OUTPATIENT)
Dept: PHARMACY | Facility: CLINIC | Age: 60
End: 2025-05-12
Payer: COMMERCIAL

## 2025-05-19 DIAGNOSIS — E78.5 HYPERLIPIDEMIA, UNSPECIFIED HYPERLIPIDEMIA TYPE: ICD-10-CM

## 2025-05-19 PROCEDURE — RXMED WILLOW AMBULATORY MEDICATION CHARGE

## 2025-05-19 RX ORDER — ATORVASTATIN CALCIUM 40 MG/1
40 TABLET, FILM COATED ORAL DAILY
Qty: 90 TABLET | Refills: 3 | OUTPATIENT
Start: 2025-05-19 | End: 2026-05-19

## 2025-05-19 RX ORDER — FENOFIBRATE 160 MG/1
160 TABLET ORAL DAILY
Qty: 90 TABLET | Refills: 3 | OUTPATIENT
Start: 2025-05-19 | End: 2026-05-19

## 2025-05-20 ENCOUNTER — OFFICE VISIT (OUTPATIENT)
Dept: PRIMARY CARE | Facility: CLINIC | Age: 60
End: 2025-05-20
Payer: COMMERCIAL

## 2025-05-20 VITALS
RESPIRATION RATE: 18 BRPM | BODY MASS INDEX: 24.87 KG/M2 | WEIGHT: 183.4 LBS | OXYGEN SATURATION: 96 % | SYSTOLIC BLOOD PRESSURE: 145 MMHG | DIASTOLIC BLOOD PRESSURE: 74 MMHG | HEART RATE: 66 BPM

## 2025-05-20 DIAGNOSIS — M71.21 BAKER'S CYST OF KNEE, RIGHT: Primary | ICD-10-CM

## 2025-05-20 PROCEDURE — 99212 OFFICE O/P EST SF 10 MIN: CPT | Performed by: INTERNAL MEDICINE

## 2025-05-20 PROCEDURE — 4010F ACE/ARB THERAPY RXD/TAKEN: CPT | Performed by: INTERNAL MEDICINE

## 2025-05-20 PROCEDURE — 3078F DIAST BP <80 MM HG: CPT | Performed by: INTERNAL MEDICINE

## 2025-05-20 PROCEDURE — 3077F SYST BP >= 140 MM HG: CPT | Performed by: INTERNAL MEDICINE

## 2025-05-20 ASSESSMENT — ENCOUNTER SYMPTOMS: ARTHRALGIAS: 1

## 2025-05-20 NOTE — PROGRESS NOTES
Subjective   Patient ID: Jose E Zelaya is a 60 y.o. male who presents for Mass (Pain/limping when walking sometimes over past week. Developed golf ball sized lump behind knee. Wife hypothesized baker's cyst).    Here for limping that started 5 days ago. As the days have progressed, a lump has appeared that gradually increased in size before stopping. Can get painful at times but not always. Last 3 days has been asymptomatic and hasn't grown.    Denies recent travel, trauma, surgery, or smoking hx.        Review of Systems   Musculoskeletal:  Positive for arthralgias.       /74 (BP Location: Right arm, Patient Position: Sitting)   Pulse 66   Resp 18   Wt 83.2 kg (183 lb 6.4 oz)   SpO2 96%   BMI 24.87 kg/m²   Objective   Physical Exam  Constitutional:       General: He is not in acute distress.     Appearance: He is not ill-appearing, toxic-appearing or diaphoretic.   HENT:      Head: Normocephalic and atraumatic.   Eyes:      Conjunctiva/sclera: Conjunctivae normal.   Musculoskeletal:      Comments: Palpable mass along posterior aspect of R knee present. Is mobile and NTTP   Neurological:      Mental Status: He is alert.         Assessment/Plan   Problem List Items Addressed This Visit    None  Visit Diagnoses         Codes      Hutchison's cyst of knee, right    -  Primary M71.21    Relevant Orders    Referral to Orthopedics and Sports Medicine        -Hx and exam concerning for bakers cyst. Reviewed w/ pt how to manage. If it worsens, pt to see ortho downstairs for possible drainage. He does not have concerning signs to make me worried about a DVT, but if leg swelling appears or worsens then pt to let us know so we can get further testing done to rule this out. Pt agreeable with plan.         Jef Cunningham MD 05/20/25 4:22 PM

## 2025-05-23 ENCOUNTER — PHARMACY VISIT (OUTPATIENT)
Dept: PHARMACY | Facility: CLINIC | Age: 60
End: 2025-05-23
Payer: COMMERCIAL

## 2025-06-16 ENCOUNTER — PATIENT OUTREACH (OUTPATIENT)
Dept: CARE COORDINATION | Facility: CLINIC | Age: 60
End: 2025-06-16
Payer: COMMERCIAL

## 2025-06-16 NOTE — PROGRESS NOTES
Left message regarding referral regarding referral from Dr. Cunningham for DSME; name and contact infor provided for return call.

## 2025-06-17 ENCOUNTER — OFFICE VISIT (OUTPATIENT)
Dept: PRIMARY CARE | Facility: CLINIC | Age: 60
End: 2025-06-17
Payer: COMMERCIAL

## 2025-06-17 VITALS
BODY MASS INDEX: 25 KG/M2 | DIASTOLIC BLOOD PRESSURE: 80 MMHG | HEART RATE: 72 BPM | RESPIRATION RATE: 12 BRPM | OXYGEN SATURATION: 96 % | SYSTOLIC BLOOD PRESSURE: 126 MMHG | WEIGHT: 184.3 LBS

## 2025-06-17 DIAGNOSIS — I10 PRIMARY HYPERTENSION: Primary | ICD-10-CM

## 2025-06-17 PROCEDURE — 99212 OFFICE O/P EST SF 10 MIN: CPT | Performed by: INTERNAL MEDICINE

## 2025-06-17 PROCEDURE — 3078F DIAST BP <80 MM HG: CPT | Performed by: INTERNAL MEDICINE

## 2025-06-17 PROCEDURE — 3074F SYST BP LT 130 MM HG: CPT | Performed by: INTERNAL MEDICINE

## 2025-06-17 PROCEDURE — 4010F ACE/ARB THERAPY RXD/TAKEN: CPT | Performed by: INTERNAL MEDICINE

## 2025-06-17 ASSESSMENT — ENCOUNTER SYMPTOMS
HEADACHES: 0
HYPERTENSION: 1

## 2025-06-17 NOTE — ASSESSMENT & PLAN NOTE
-Pt here today for evaluation of his BP. Has been getting 140-150 consistently at home, and last night was up to 170. Is on lisinopril 40mg daily.  -Our automatic here had him in the 140s systolic, but both my MA and myself otherwise got his SBP in the 120s (I even got a smaller size cuff when I checked which was still similar). Last visit was in the 140s, but that was only an automatic cuff.  -Pt to come in next week for a nurses visit. Will bring in his home cuff so we can see what the difference is. Possible that automatic cuffs read his numbers higher (although this doesn't otherwise explain why his home cuff didn't previously have this issue. Pt agreeable with plan.

## 2025-06-17 NOTE — PROGRESS NOTES
Subjective   Patient ID: Jose E Zelaya is a 60 y.o. male who presents for Hypertension.    Pt has been checking BP at home since last visit.    On his home BP monitor has been in the 140-150s. Last night was up to the 170s. Checked his wife and daughter's BP and theirs was normal.    Has been using this cuff for years. Used to check once a month and was fine prior to this. Denies chest pain or HA, but has had more brain fog.    Hypertension  Pertinent negatives include no chest pain or headaches.       Review of Systems   Cardiovascular:  Negative for chest pain.   Neurological:  Negative for headaches.       /80   Pulse 72   Resp 12   Wt 83.6 kg (184 lb 4.8 oz)   SpO2 96%   BMI 25.00 kg/m²   Objective   Physical Exam  Constitutional:       General: He is not in acute distress.     Appearance: He is not ill-appearing, toxic-appearing or diaphoretic.   HENT:      Head: Normocephalic and atraumatic.   Eyes:      Conjunctiva/sclera: Conjunctivae normal.   Neurological:      Mental Status: He is alert.         Assessment/Plan   Problem List Items Addressed This Visit           ICD-10-CM    Hypertension - Primary I10    -Pt here today for evaluation of his BP. Has been getting 140-150 consistently at home, and last night was up to 170. Is on lisinopril 40mg daily.  -Our automatic here had him in the 140s systolic, but both my MA and myself otherwise got his SBP in the 120s (I even got a smaller size cuff when I checked which was still similar). Last visit was in the 140s, but that was only an automatic cuff.  -Pt to come in next week for a nurses visit. Will bring in his home cuff so we can see what the difference is. Possible that automatic cuffs read his numbers higher (although this doesn't otherwise explain why his home cuff didn't previously have this issue. Pt agreeable with plan.                 Jef Cunningham MD 06/17/25 11:19 AM

## 2025-06-20 PROCEDURE — RXMED WILLOW AMBULATORY MEDICATION CHARGE

## 2025-06-23 ENCOUNTER — PATIENT OUTREACH (OUTPATIENT)
Dept: CARE COORDINATION | Facility: CLINIC | Age: 60
End: 2025-06-23
Payer: COMMERCIAL

## 2025-06-23 ENCOUNTER — PHARMACY VISIT (OUTPATIENT)
Dept: PHARMACY | Facility: CLINIC | Age: 60
End: 2025-06-23
Payer: COMMERCIAL

## 2025-06-23 NOTE — PROGRESS NOTES
2nd outreach attempt: Left message regarding referral from Dr. Cunningham for DSME. Name and contact info provided for return call

## 2025-06-24 ENCOUNTER — APPOINTMENT (OUTPATIENT)
Dept: PRIMARY CARE | Facility: CLINIC | Age: 60
End: 2025-06-24
Payer: COMMERCIAL

## 2025-06-24 VITALS — DIASTOLIC BLOOD PRESSURE: 68 MMHG | SYSTOLIC BLOOD PRESSURE: 130 MMHG

## 2025-06-24 NOTE — PROGRESS NOTES
Patient came for bp cuff calibration. My manual and the automatic machine showed BP within 126-130 systolic with acceptable diastolic range. Last office visit also yielded BP of 126/80. Pt remarked that his wife consistently got good bp readings with the cuff whereas he repeatedly got readings of 140-170. Advised patient that the in-office readings suggest his bp is not elevated, especially not as hypertensive as his cuff suggests. He may need a new cuff. Patient understood.

## 2025-06-26 ENCOUNTER — PATIENT OUTREACH (OUTPATIENT)
Dept: CARE COORDINATION | Facility: CLINIC | Age: 60
End: 2025-06-26
Payer: COMMERCIAL

## 2025-06-26 NOTE — PROGRESS NOTES
No response from previous outreach attempts: referral closed at this time.. RD remains available as needed

## 2025-06-27 ENCOUNTER — APPOINTMENT (OUTPATIENT)
Dept: OPHTHALMOLOGY | Facility: CLINIC | Age: 60
End: 2025-06-27
Payer: COMMERCIAL

## 2025-06-27 DIAGNOSIS — H02.051 TRICHIASIS WITHOUT ENTROPION OF RIGHT UPPER EYELID: ICD-10-CM

## 2025-06-27 DIAGNOSIS — H52.03 HYPERMETROPIA OF BOTH EYES: Primary | ICD-10-CM

## 2025-06-27 DIAGNOSIS — H52.4 PRESBYOPIA: ICD-10-CM

## 2025-06-27 DIAGNOSIS — H52.223 REGULAR ASTIGMATISM OF BOTH EYES: ICD-10-CM

## 2025-06-27 DIAGNOSIS — E11.69 TYPE 2 DIABETES MELLITUS WITH HYPERLIPIDEMIA (MULTI): ICD-10-CM

## 2025-06-27 DIAGNOSIS — H02.59: ICD-10-CM

## 2025-06-27 DIAGNOSIS — E78.5 TYPE 2 DIABETES MELLITUS WITH HYPERLIPIDEMIA (MULTI): ICD-10-CM

## 2025-06-27 DIAGNOSIS — H02.203: ICD-10-CM

## 2025-06-27 ASSESSMENT — REFRACTION_MANIFEST
OS_ADD: +2.75
OD_AXIS: 073
OD_SPHERE: +3.25
OD_CYLINDER: -0.50
OS_AXIS: 065
OS_SPHERE: +3.50
OS_CYLINDER: -0.50
OD_ADD: +2.75

## 2025-06-27 ASSESSMENT — REFRACTION_WEARINGRX
OD_SPHERE: +3.25
OD_CYLINDER: -0.50
OS_AXIS: 065
OS_ADD: +2.50
OD_ADD: +2.50
OS_SPHERE: +3.25
OS_CYLINDER: -0.50
OD_AXIS: 073

## 2025-06-27 ASSESSMENT — REFRACTION_CURRENTRX
OD_BASECURVE: 8.6
OD_SPHERE: +3.00
OD_DIAMETER: 14.2
OD_ADD: HI
OS_SPHERE: +3.00
OS_DIAMETER: 14.2
OS_BASECURVE: 8.6
OS_BRAND: AIR OPTIX MULTIFOCAL
OD_BRAND: AIR OPTIX MULTIFOCAL
OS_ADD: HI

## 2025-06-27 ASSESSMENT — VISUAL ACUITY
OS_CC: 20/20
OD_CC: J1+
OS_CC: J1+
METHOD: SNELLEN - LINEAR
VA_OR_OS_CURRENT_RX: 20/20
CORRECTION_TYPE: CONTACTS
OD_CC: 20/25

## 2025-06-27 ASSESSMENT — REFRACTION
OS_CYLINDER: -0.50
OS_AXIS: 065
OD_ADD: +2.75
OD_AXIS: 073
OD_SPHERE: +3.25
OS_SPHERE: +3.25
OD_CYLINDER: -0.50
OS_ADD: +2.75

## 2025-06-27 ASSESSMENT — CONF VISUAL FIELD
OS_SUPERIOR_TEMPORAL_RESTRICTION: 0
OS_NORMAL: 1
OD_INFERIOR_TEMPORAL_RESTRICTION: 0
OD_INFERIOR_NASAL_RESTRICTION: 0
OS_INFERIOR_NASAL_RESTRICTION: 0
OS_SUPERIOR_NASAL_RESTRICTION: 0
OD_NORMAL: 1
OD_SUPERIOR_TEMPORAL_RESTRICTION: 0
METHOD: COUNTING FINGERS
OS_INFERIOR_TEMPORAL_RESTRICTION: 0
OD_SUPERIOR_NASAL_RESTRICTION: 0

## 2025-06-27 ASSESSMENT — EXTERNAL EXAM - LEFT EYE: OS_EXAM: NORMAL

## 2025-06-27 ASSESSMENT — ENCOUNTER SYMPTOMS
EYES NEGATIVE: 0
CARDIOVASCULAR NEGATIVE: 0
RESPIRATORY NEGATIVE: 0
CONSTITUTIONAL NEGATIVE: 0
PSYCHIATRIC NEGATIVE: 0
ALLERGIC/IMMUNOLOGIC NEGATIVE: 0
NEUROLOGICAL NEGATIVE: 0
HEMATOLOGIC/LYMPHATIC NEGATIVE: 0
MUSCULOSKELETAL NEGATIVE: 0
GASTROINTESTINAL NEGATIVE: 0
ENDOCRINE NEGATIVE: 0

## 2025-06-27 ASSESSMENT — EXTERNAL EXAM - RIGHT EYE: OD_EXAM: NORMAL

## 2025-06-27 ASSESSMENT — TONOMETRY
OD_IOP_MMHG: 12
OS_IOP_MMHG: 12
IOP_METHOD: GOLDMANN APPLANATION

## 2025-06-27 ASSESSMENT — CUP TO DISC RATIO
OS_RATIO: .35
OD_RATIO: .3

## 2025-06-27 ASSESSMENT — SLIT LAMP EXAM - LIDS: COMMENTS: GOOD POSITION

## 2025-06-27 NOTE — Clinical Note
Both eyes (OU) Contact Lens Order Contact Lens Current Rx    Current Contact Lens Rx (Ordered)      Brand Base Curve Diameter Sphere Add Dist VA Near VA Centration  Movement   Right Air Optix Multifocal 8.6 14.2 +3.00 HI   Left Air Optix Multifocal 8.6 14.2 +3.00 HI      Quantity: 2 Package: TRIAL Appointment needed? No Medically necessary? No Ship To: Home Additional instructions: Patient has been having issue with his current R lens where it moves a lot, he swapped it to the L eye and he still felt the same movement issue. I think the lens may be defective. I want to order a trial pair to ensure the issue doesn't continue when he orders a supply. If it does we'll switch brands. Thanks!

## 2025-06-27 NOTE — PROGRESS NOTES
Assessment/Plan   Diagnoses and all orders for this visit:  Hypermetropia of both eyes  Regular astigmatism of both eyes  Presbyopia  New spec rx released today per patient request. Ocular health wnl for age OU. Monitor 1 year or sooner prn. Refraction billed today. Pt consents to receiving glasses Rx today. Patient's/guardian's signature obtained to acknowledge and confirm that a paper copy of glasses Rx was given to patient in compliance with [facility] Eyeglass Rule. Electronic copy of Rx will also be available via [employer].   Discussed proper wear, care, replacement of contact lenses. Gave handout. D/c cl wear and RTC if eyes become red, painful, irritated. Monitor 1 year.   CL eval billed today. $35. Will ensure good comfort w/ trial pair prior to ordering new supply. Consider switch brand if issue persists.     Type 2 diabetes mellitus with hyperlipidemia (Multi)  The patient has diabetes without any evidence of retinopathy.  The patient was advised to maintain tight glucose control, tight blood pressure control, and favorable levels of cholesterol, low density lipoprotein, and high density lipoproteins.  Follow up in one year was recommended.    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 Option for qhs harrison / rx drop w/ worsening symptoms.

## 2025-07-16 DIAGNOSIS — E78.5 HYPERLIPIDEMIA, UNSPECIFIED HYPERLIPIDEMIA TYPE: ICD-10-CM

## 2025-07-16 PROCEDURE — RXMED WILLOW AMBULATORY MEDICATION CHARGE

## 2025-07-16 RX ORDER — FENOFIBRATE 160 MG/1
160 TABLET ORAL DAILY
Qty: 90 TABLET | Refills: 3 | Status: SHIPPED | OUTPATIENT
Start: 2025-07-16 | End: 2026-07-16

## 2025-07-19 ENCOUNTER — PHARMACY VISIT (OUTPATIENT)
Dept: PHARMACY | Facility: CLINIC | Age: 60
End: 2025-07-19
Payer: COMMERCIAL

## 2025-07-24 ENCOUNTER — TELEPHONE (OUTPATIENT)
Dept: OPHTHALMOLOGY | Facility: CLINIC | Age: 60
End: 2025-07-24
Payer: COMMERCIAL

## 2025-07-28 ENCOUNTER — TELEPHONE (OUTPATIENT)
Dept: OPHTHALMOLOGY | Facility: CLINIC | Age: 60
End: 2025-07-28

## 2025-08-04 DIAGNOSIS — E78.5 TYPE 2 DIABETES MELLITUS WITH HYPERLIPIDEMIA (MULTI): ICD-10-CM

## 2025-08-04 DIAGNOSIS — E11.69 TYPE 2 DIABETES MELLITUS WITH HYPERLIPIDEMIA (MULTI): ICD-10-CM

## 2025-08-04 PROCEDURE — RXMED WILLOW AMBULATORY MEDICATION CHARGE

## 2025-08-04 RX ORDER — DAPAGLIFLOZIN AND METFORMIN HYDROCHLORIDE 5; 1000 MG/1; MG/1
2 TABLET, FILM COATED, EXTENDED RELEASE ORAL DAILY
Qty: 180 TABLET | Refills: 3 | Status: SHIPPED | OUTPATIENT
Start: 2025-08-04 | End: 2026-08-04

## 2025-08-07 ENCOUNTER — PHARMACY VISIT (OUTPATIENT)
Dept: PHARMACY | Facility: CLINIC | Age: 60
End: 2025-08-07
Payer: COMMERCIAL

## 2025-08-11 ENCOUNTER — PHARMACY VISIT (OUTPATIENT)
Dept: PHARMACY | Facility: CLINIC | Age: 60
End: 2025-08-11

## 2025-08-11 ENCOUNTER — PHARMACY VISIT (OUTPATIENT)
Dept: PHARMACY | Facility: CLINIC | Age: 60
End: 2025-08-11
Payer: COMMERCIAL

## 2025-08-26 DIAGNOSIS — E78.5 HYPERLIPIDEMIA, UNSPECIFIED HYPERLIPIDEMIA TYPE: ICD-10-CM

## 2025-08-26 RX ORDER — ATORVASTATIN CALCIUM 40 MG/1
40 TABLET, FILM COATED ORAL DAILY
Qty: 90 TABLET | Refills: 3 | OUTPATIENT
Start: 2025-08-26 | End: 2026-08-26

## 2025-09-09 ENCOUNTER — APPOINTMENT (OUTPATIENT)
Dept: PRIMARY CARE | Facility: CLINIC | Age: 60
End: 2025-09-09
Payer: COMMERCIAL

## 2025-10-01 ENCOUNTER — APPOINTMENT (OUTPATIENT)
Dept: CARDIOLOGY | Facility: CLINIC | Age: 60
End: 2025-10-01
Payer: COMMERCIAL

## 2025-10-22 ENCOUNTER — APPOINTMENT (OUTPATIENT)
Dept: CARDIOLOGY | Facility: CLINIC | Age: 60
End: 2025-10-22
Payer: COMMERCIAL

## 2026-06-30 ENCOUNTER — APPOINTMENT (OUTPATIENT)
Dept: OPHTHALMOLOGY | Facility: CLINIC | Age: 61
End: 2026-06-30
Payer: COMMERCIAL